# Patient Record
Sex: FEMALE | Race: WHITE | NOT HISPANIC OR LATINO | Employment: OTHER | URBAN - METROPOLITAN AREA
[De-identification: names, ages, dates, MRNs, and addresses within clinical notes are randomized per-mention and may not be internally consistent; named-entity substitution may affect disease eponyms.]

---

## 2017-02-27 ENCOUNTER — OFFICE VISIT (OUTPATIENT)
Dept: UROLOGY | Facility: CLINIC | Age: 72
End: 2017-02-27
Payer: MEDICARE

## 2017-02-27 VITALS
WEIGHT: 224.88 LBS | SYSTOLIC BLOOD PRESSURE: 112 MMHG | DIASTOLIC BLOOD PRESSURE: 62 MMHG | RESPIRATION RATE: 16 BRPM | BODY MASS INDEX: 39.84 KG/M2 | HEART RATE: 60 BPM | HEIGHT: 63 IN

## 2017-02-27 DIAGNOSIS — N39.46 MIXED INCONTINENCE URGE AND STRESS: Primary | ICD-10-CM

## 2017-02-27 PROCEDURE — 99214 OFFICE O/P EST MOD 30 MIN: CPT | Mod: S$GLB,,, | Performed by: UROLOGY

## 2017-02-27 PROCEDURE — 1159F MED LIST DOCD IN RCRD: CPT | Mod: S$GLB,,, | Performed by: UROLOGY

## 2017-02-27 PROCEDURE — 3078F DIAST BP <80 MM HG: CPT | Mod: S$GLB,,, | Performed by: UROLOGY

## 2017-02-27 PROCEDURE — 99999 PR PBB SHADOW E&M-EST. PATIENT-LVL IV: CPT | Mod: PBBFAC,,, | Performed by: UROLOGY

## 2017-02-27 PROCEDURE — 1126F AMNT PAIN NOTED NONE PRSNT: CPT | Mod: S$GLB,,, | Performed by: UROLOGY

## 2017-02-27 PROCEDURE — 3074F SYST BP LT 130 MM HG: CPT | Mod: S$GLB,,, | Performed by: UROLOGY

## 2017-02-27 PROCEDURE — 1157F ADVNC CARE PLAN IN RCRD: CPT | Mod: S$GLB,,, | Performed by: UROLOGY

## 2017-02-27 PROCEDURE — 1160F RVW MEDS BY RX/DR IN RCRD: CPT | Mod: S$GLB,,, | Performed by: UROLOGY

## 2017-02-27 RX ORDER — ISOSORBIDE MONONITRATE 60 MG/1
60 TABLET, EXTENDED RELEASE ORAL EVERY MORNING
COMMUNITY
Start: 2017-02-15 | End: 2017-11-14 | Stop reason: CLARIF

## 2017-02-27 RX ORDER — CIPROFLOXACIN 250 MG/1
500 TABLET, FILM COATED ORAL
Status: CANCELLED | OUTPATIENT
Start: 2017-02-27

## 2017-02-27 NOTE — PROGRESS NOTES
"  Subjective:       Dilcia Orozco is a 72 y.o. female who is an established patient who was referred by Dr Kohler for evaluation of incontinence.      She reports issues with UI for about 4-6 months. She reports she "leaks all the time." She is unable to express what makes her leak - likely combination of VIRGEN and UUI. 3ppd. Nocturia x 2. Denies UTI. Constipation occasionally. She has Myrbetriq listed in MAR but is unsure if she is taking this.     A1c (6/12) - 12.6. She has known DM2 with neuropathy.  She again had issues with hypoglycemia today in clinic (she takes DM meds but does not eat breakfast).     PVR (bladder scan) last visit - 0cc, 15cc    She returns today reporting that symptoms are about the same. There is again some confusion if she is taking Ditropan as well as Myrbetriq. She seemed then to be only be taking Ditropan and not Myrbetriq (last visit was the opposite). Nocturia x 2. Continues to report UUI though difficult to explain UI episodes. She is taking both medications now without improvement.     I see her  as a patient as well (PCa treated by [facility]).       The following portions of the patient's history were reviewed and updated as appropriate: allergies, current medications, past family history, past medical history, past social history, past surgical history and problem list.    Review of Systems  Constitutional: no fever or chills  ENT: no nasal congestion or sore throat  Respiratory: no cough or shortness of breath  Cardiovascular: no chest pain or palpitations  Gastrointestinal: no nausea or vomiting, tolerating diet  Genitourinary: as per HPI  Hematologic/Lymphatic: no easy bruising or lymphadenopathy  Musculoskeletal: no arthralgias or myalgias  Skin: no rashes or lesions  Neurological: no seizures or tremors  Behavioral/Psych: no auditory or visual hallucinations       Objective:    Vitals:   /62  Pulse 60  Resp 16  Ht 5' 3" (1.6 m)  Wt 102 kg (224 lb " 13.9 oz)  BMI 39.83 kg/m2    Physical Exam   General: well developed, well nourished in no acute distress  Head: normocephalic, atraumatic  Neck: supple, trachea midline, no obvious enlargement of thyroid  HEENT: EOMI, mucus membranes moist, sclera anicteric, no hearing impairment  Lungs: symmetric expansion, non-labored breathing  Cardiovascular: regular rate and rhythm, normal pulses  Abdomen: soft, non tender, non distended, no palpable masses, no hepatosplenomegaly, no hernias, no CVA tenderness  Musculoskeletal: no peripheral edema, normal ROM in bilateral upper and lower extremities  Lymphatics: no cervical or inguinal lymphadenopathy  Skin: no rashes or lesions  Neuro: alert and oriented x 3, no gross deficits  Psych: normal judgment and insight, normal mood/affect and non-anxious  Genitourinary:   patient declined exam      Lab Review   Urine analysis today in clinic shows positive for small ketones    Lab Results   Component Value Date    WBC 8.57 06/01/2012    HGB 11.4 (L) 06/01/2012    HCT 36.9 (L) 06/01/2012    MCV 81.1 (L) 06/01/2012     06/01/2012     Lab Results   Component Value Date    CREATININE 0.9 06/01/2012    BUN 19 06/01/2012       Imaging  NA       Assessment/Plan:      1. Mixed incontinence urge and stress    - Discussed difference of UUI and VIRGEN components. Reviewed etiology and workup of each.   - VIRGEN: Kegels, PFPT, bulking agent, MUS.   - UUI: Behavioral changes, PFPT, anticholinergics. Botox/InterStim for refractory UUI.   - Discussed importance of glucose control. Polyuria will make UI worse.   - Weight loss can also improve situation.   - Confusion as to what medication she is actually taking - seems to be taking both now (brought in all meds today)    - On Ditropan and Myrbetriq - still with sig UI   - Remains with DM issues   - Significant glucosuria that is likely contributing. No recent A1c in chart. Clear today.   - Plan for cysto/UDS 3/21/17 - ?Botox          Again  discussed importance of eating if taking DM medications. Hypoglycemia treated in clinic again today with PO glucose tabs.       Follow up in 2 weeks post-op

## 2017-02-27 NOTE — MR AVS SNAPSHOT
"    [facility]  [address]  [address]  [place] [address]  Phone: [phone]                  Dilcia Orozco   2017 11:00 AM   Office Visit    Description:  Female : 1945   Provider:  Jud Silverman MD   Department:  [facility]           Reason for Visit     Follow-up           Diagnoses this Visit        Comments    Mixed incontinence urge and stress    -  Primary            To Do List           Goals (5 Years of Data)              Today    16    Blood Pressure < 140/90   112/62  112/62  112/62    HEMOGLOBIN A1C < 7.0           LDL CHOLESTEROL < 100             Follow-Up and Disposition     Return in about 6 weeks (around 4/10/2017) for Post-op.      [facility]     [facility] Care [facility] -  Assistance  Registered nurses in the [facility] provide clinical advisement, health education, appointment booking, and other advisory services.  Call for this free service at [phone].             Medications           Message regarding Medications     Verify the changes and/or additions to your medication regime listed below are the same as discussed with your clinician today.  If any of these changes or additions are incorrect, please notify your healthcare provider.             Verify that the below list of medications is an accurate representation of the medications you are currently taking.  If none reported, the list may be blank. If incorrect, please contact your healthcare provider. Carry this list with you in case of emergency.           Current Medications     aspirin 81 MG Chew Take 81 mg by mouth once daily.    insulin needles, disposable, (BD ULTRA-FINE ENEIDA PEN NEEDLES) 32 x 5/32 " Ndle Inject 1 Syringe into the skin once daily.    isosorbide mononitrate (IMDUR) 60 MG 24 hr tablet     lancets (MICROLET LANCET) Misc Use as directed for blood sugar management    LEVEMIR FLEXTOUCH 100 unit/mL (3 mL) " InPn pen INJECT 35 UNITS UNDER THE SKIN TWICE DAILY    lisinopril (PRINIVIL,ZESTRIL) 20 MG tablet TAKE 1 TABLET BY MOUTH TWICE DAILY    metformin (GLUCOPHAGE) 1000 MG tablet Take 1,000 mg by mouth 2 (two) times daily with meals.    metoprolol succinate (TOPROL-XL) 25 MG 24 hr tablet Take 25 mg by mouth once daily.    mirabegron 50 mg Tb24 Take 1 tablet (50 mg total) by mouth once daily.    MYRBETRIQ 50 mg Tb24 TK 1 T PO QD    nitroGLYCERIN (NITROSTAT) 0.4 MG SL tablet Place 0.4 mg under the tongue every 5 (five) minutes as needed for Chest pain.    NOVOLOG FLEXPEN 100 unit/mL InPn pen INJECT 40 UNITS UNDER SKIN TID AC    oxybutynin (DITROPAN-XL) 10 MG 24 hr tablet Take 1 tablet (10 mg total) by mouth once daily.    simvastatin (ZOCOR) 20 MG tablet TK 1 T PO ONCE A DAY    tramadol (ULTRAM) 50 mg tablet Take 50 mg by mouth every 6 (six) hours as needed for Pain.    glucagon (human recombinant) inj 1mg/mL kit Inject 1 mL (1 mg total) into the skin As instructed. Follow package directions for low blood sugar.           Clinical Reference Information           Your Vitals Were     BP                   112/62           Blood Pressure          Most Recent Value    BP  112/62      Allergies as of 2/27/2017     Sulfa (Sulfonamide Antibiotics)    Vicodin [Hydrocodone-acetaminophen]    Meclomen      Immunizations Administered on Date of Encounter - 2/27/2017     None      [facility] Sign-Up     Activating your [facility] account is as easy as 1-2-3!     1) Visit [email or web address], select Sign Up Now, enter this activation code and your date of birth, then select Next.  [email or web address]  Expires: 4/13/2017 11:46 AM      2) Create a username and password to use when you visit [facility] in the future and select a security question in case you lose your password and select Next.    3) Enter your e-mail address and click Sign Up!    Additional Information  If you have questions, please e-mail [email or web address] or call [phone]  to talk to our [facility] staff. Remember, [facility] is NOT to be used for urgent needs. For medical emergencies, dial 911.         Language Assistance Services     ATTENTION: Language assistance services are available, free of charge. Please call [phone].      ATENCIÓN: Si habla diaz, tiene a merchant disposición servicios gratuitos de asistencia lingüística. Llame al [phone].     CHÚ Ý: N?u b?n nói Ti?ng Vi?t, có các d?ch v? h? tr? ngôn ng? mi?n phí dành cho b?n. G?i s? [phone].         [facility] complies with applicable Federal civil rights laws and does not discriminate on the basis of race, color, national origin, age, disability, or sex.

## 2017-03-03 ENCOUNTER — TELEPHONE (OUTPATIENT)
Dept: UROLOGY | Facility: CLINIC | Age: 72
End: 2017-03-03

## 2017-03-03 NOTE — TELEPHONE ENCOUNTER
Patient instructed to call the number on her packet of pre-op/consent papers to schedule a pre-op apt. Patient verbalized understanding.

## 2017-03-03 NOTE — TELEPHONE ENCOUNTER
----- Message from Tracie Olmos sent at 3/3/2017 10:06 AM CST -----  Contact: self  Pt states that she received papers from provider today and was told to call office. She can be reached @ [phone] [employer]

## 2017-03-07 ENCOUNTER — TELEPHONE (OUTPATIENT)
Dept: UROLOGY | Facility: CLINIC | Age: 72
End: 2017-03-07

## 2017-03-07 NOTE — TELEPHONE ENCOUNTER
----- Message from Brittany Montelongo sent at 3/7/2017  2:22 PM CST -----  Contact: [phone]  Pt is wanting to know the scheduled procedure is for on 3/21 Please call pt at your earliest convenience.  Thanks !

## 2017-03-07 NOTE — TELEPHONE ENCOUNTER
Patient wishes to push back the surgery until mid-April. Can you give me a date around that time? Thank you

## 2017-03-21 ENCOUNTER — HOSPITAL ENCOUNTER (OUTPATIENT)
Dept: PREADMISSION TESTING | Facility: HOSPITAL | Age: 72
Discharge: HOME | End: 2017-03-21
Attending: UROLOGY | Admitting: UROLOGY
Payer: MEDICARE

## 2017-03-21 ENCOUNTER — HOSPITAL ENCOUNTER (OUTPATIENT)
Facility: HOSPITAL | Age: 72
Discharge: HOME | End: 2017-03-21
Attending: UROLOGY | Admitting: UROLOGY
Payer: MEDICARE

## 2017-03-21 ENCOUNTER — SURGERY (OUTPATIENT)
Age: 72
End: 2017-03-21

## 2017-03-21 VITALS
BODY MASS INDEX: 37.91 KG/M2 | TEMPERATURE: 97 F | RESPIRATION RATE: 20 BRPM | OXYGEN SATURATION: 98 % | HEIGHT: 62 IN | DIASTOLIC BLOOD PRESSURE: 82 MMHG | HEART RATE: 82 BPM | WEIGHT: 206 LBS | SYSTOLIC BLOOD PRESSURE: 163 MMHG

## 2017-03-21 VITALS
RESPIRATION RATE: 20 BRPM | OXYGEN SATURATION: 97 % | DIASTOLIC BLOOD PRESSURE: 79 MMHG | TEMPERATURE: 98 F | SYSTOLIC BLOOD PRESSURE: 143 MMHG | HEART RATE: 82 BPM

## 2017-03-21 DIAGNOSIS — N39.46 MIXED INCONTINENCE URGE AND STRESS: Primary | ICD-10-CM

## 2017-03-21 PROCEDURE — 71000015 HC POSTOP RECOV 1ST HR: Performed by: UROLOGY

## 2017-03-21 PROCEDURE — 51728 CYSTOMETROGRAM W/VP: CPT | Mod: 26,,, | Performed by: UROLOGY

## 2017-03-21 PROCEDURE — 76000 FLUOROSCOPY <1 HR PHYS/QHP: CPT | Mod: 26,59,, | Performed by: UROLOGY

## 2017-03-21 PROCEDURE — 52000 CYSTOURETHROSCOPY: CPT | Mod: 59,,, | Performed by: UROLOGY

## 2017-03-21 PROCEDURE — 36000707: Performed by: UROLOGY

## 2017-03-21 PROCEDURE — 25000003 PHARM REV CODE 250: Performed by: UROLOGY

## 2017-03-21 PROCEDURE — 51784 ANAL/URINARY MUSCLE STUDY: CPT | Mod: 26,51,, | Performed by: UROLOGY

## 2017-03-21 PROCEDURE — 51797 INTRAABDOMINAL PRESSURE TEST: CPT | Mod: 26,,, | Performed by: UROLOGY

## 2017-03-21 PROCEDURE — 25500020 PHARM REV CODE 255: Performed by: UROLOGY

## 2017-03-21 PROCEDURE — 36000706: Performed by: UROLOGY

## 2017-03-21 RX ORDER — ONDANSETRON 2 MG/ML
4 INJECTION INTRAMUSCULAR; INTRAVENOUS EVERY 12 HOURS PRN
Status: DISCONTINUED | OUTPATIENT
Start: 2017-03-21 | End: 2017-03-21 | Stop reason: HOSPADM

## 2017-03-21 RX ORDER — CIPROFLOXACIN 500 MG/1
500 TABLET ORAL
Status: COMPLETED | OUTPATIENT
Start: 2017-03-21 | End: 2017-03-21

## 2017-03-21 RX ORDER — ACETAMINOPHEN 325 MG/1
650 TABLET ORAL EVERY 4 HOURS PRN
Status: DISCONTINUED | OUTPATIENT
Start: 2017-03-21 | End: 2017-03-21 | Stop reason: HOSPADM

## 2017-03-21 RX ORDER — LIDOCAINE HYDROCHLORIDE 20 MG/ML
JELLY TOPICAL
Status: DISCONTINUED | OUTPATIENT
Start: 2017-03-21 | End: 2017-03-21 | Stop reason: HOSPADM

## 2017-03-21 RX ADMIN — LIDOCAINE HYDROCHLORIDE 10 ML: 20 JELLY TOPICAL at 02:03

## 2017-03-21 RX ADMIN — IOTHALAMATE MEGLUMINE 250 ML: 172 INJECTION URETERAL at 02:03

## 2017-03-21 RX ADMIN — CIPROFLOXACIN HYDROCHLORIDE 500 MG: 500 TABLET, FILM COATED ORAL at 01:03

## 2017-03-21 NOTE — OP NOTE
"[facility]  Surgery Department  Operative Note      Date of Procedure:  03/21/2017 2:23 PM     Procedure:   1. Complex urodynamics with fluoroscopy  2. Cystoscopy    Surgeon(s) and Role:     * Jud Silverman MD - Primary    Assisting Surgeon: None    Pre-Operative Diagnosis: Mixed incontinence urge and stress [788.33]    Post-Operative Diagnosis: Post-Op Diagnosis Codes:     * Mixed incontinence urge and stress [788.33]    Indication for procedure:  Dilcia Orozco is a 72 y.o. female who is an established patient who was referred by Dr Kohler for evaluation of incontinence.      She reports issues with UI for about 4-6 months. She reports she "leaks all the time." She is unable to express what makes her leak - likely combination of VIRGEN and UUI. 3ppd. Nocturia x 2. Denies UTI. Constipation occasionally. She has Myrbetriq listed in MAR but is unsure if she is taking this.      A1c (6/12) - 12.6. She has known DM2 with neuropathy. She again had issues with hypoglycemia today in clinic (she takes DM meds but does not eat breakfast).      PVR (bladder scan) last visit - 0cc, 15cc     She returns today reporting that symptoms are about the same. There is again some confusion if she is taking Ditropan as well as Myrbetriq. She seemed then to be only be taking Ditropan and not Myrbetriq (last visit was the opposite). Nocturia x 2. Continues to report UUI though difficult to explain UI episodes. She is taking both medications now without improvement.       Description of procedure:  The patient was brought to the urodynamics suite and transferred to the urodynamics chair. Full timeout procedures were performed identifying the correct patient and procedure. Appropriate antibiotics with PO ciprofloxacin were given prior to commencement of surgery A 16-Citizen of [place] red rubber catheter was then placed per urethra after genitals were prepped with Betadine solution to remove any residual urine in " bladder.  P1 and P2 catheters were placed in the urethra into the bladder and rectally respectively. EMG senses were placed in the appropriate location on perineum and left leg. The bladder was filled at an appropriately slow rate.    PVR pre-procedure: 20mL    Filling phase:  Rate of fillin mL/min  First sensation: NA  First desire: 117mL  Strong desire: NA  Capacity: 149mL  Permission to void given at NA    Stress maneuvers (Valsalva and cough) at 150mL: full bladder volume leak  VLLP: unable to calculate (but very low)  Compliance: normal  ALLP: NA  Involuntary bladder contraction: frequent, low volume with UUI    Voiding phase:  Bladder contraction: present with IBC  Coordination: normal  Flow rate (max): 29.7mL/s  Flow rate (avg): 6.4mL/s  Pdet (max flow): 5.2cm H2O  Max Pdet: 32.7cm H2O  Voided volume: 165mL  PVR: 0mL    Fluoroscopy:  Bladder wall: smooth  Voiding: open bladder neck  Vesicoureteral reflux: none  Radiographic PVR: empty      Cystoscopy:  At the conclusion of the urodynamics procedure, P1 and P2 catheters as well as EMG sensors were removed. The patient then was placed in the dorsal lithotomy position and prepped and draped in the usual sterile fashion. Uro-Jet lidocaine was placed in the urethra for alfredo-operative analgesia. A 16-Maldivian flexible cystoscope was passed per urethra into the bladder. Full cystoscopy was performed systematically to inspect all aspects of the bladder wall. Retroflexion of the cystoscope was done to inspect the bladder neck. Bilateral UOs were visualized. Urethra was carefully inspected upon removal of cystoscope. The procedure was terminated. The patient tolerated the procedure well.    Urethra: normal, retracted  Bladder mucosa: smooth  Trabeculation: minimal  UOs: normal      Anesthesia: Local    Findings of the Procedure: Urgency with UUI at low volumes - ++DO/IBC. Difficult to fill bladder so low capacity on UDS. VIRGEN test with large volume leak and suspected  "VIRGEN-induced DO causing full emptying of bladder.     Complications: none    Estimated Blood Loss (EBL): 0cc          Drains: none    Specimens: none     Attestation: I was present the entirety of the procedure.           Disposition:  Patient is stable and deemed appropriate for discharge home. The patient will follow up in 2-3 weeks.    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 Recommend Botox initially for UUI. Sling would be technically challenging. Consider bulking agent though will like still have VIRGEN. Pessary unlikely to work due to severe vaginal sensitivity.        Discharge Note    SUMMARY     Admit Date:  03/21/2017 3:48 PM    Discharge Date and Time:  03/21/2017 3:48 PM    Hospital Course (synopsis of major diagnoses, care, treatment, and services provided during the course of the hospital stay): Uncomplicated cysto/UDS.     Final Diagnosis: Post-Op Diagnosis Codes:     * Mixed incontinence urge and stress [788.33]    Disposition: Home or Self Care    Follow Up/Patient Instructions:   Expect blood and burning with urination. Drink plenty of fluids.    Medications:  Reconciled Home Medications:   Current Discharge Medication List      CONTINUE these medications which have NOT CHANGED    Details   aspirin 81 MG Chew Take 81 mg by mouth once daily.      lisinopril (PRINIVIL,ZESTRIL) 20 MG tablet TAKE 1 TABLET BY MOUTH TWICE DAILY  Qty: 90 tablet, Refills: 0      metformin (GLUCOPHAGE) 1000 MG tablet Take 1,000 mg by mouth 2 (two) times daily with meals.      metoprolol succinate (TOPROL-XL) 25 MG 24 hr tablet Take 25 mg by mouth once daily.      insulin needles, disposable, (BD ULTRA-FINE ENEIDA PEN NEEDLES) 32 x 5/32 " Ndle Inject 1 Syringe into the skin once daily.  Qty: 100 each, Refills: 12    Comments: 90 day supply  Associated Diagnoses: Type II or unspecified type diabetes mellitus without mention of complication, not stated as uncontrolled    "   isosorbide mononitrate (IMDUR) 60 MG 24 hr tablet       lancets (MICROLET LANCET) Misc Use as directed for blood sugar management  Qty: 300 each, Refills: 3    Comments: **Patient requests 90 day supply**  Associated Diagnoses: Type II or unspecified type diabetes mellitus without mention of complication, not stated as uncontrolled      LEVEMIR FLEXTOUCH 100 unit/mL (3 mL) InPn pen INJECT 35 UNITS UNDER THE SKIN TWICE DAILY  Qty: 60 mL, Refills: 4    Comments: **Patient requests 90 days supply**  Associated Diagnoses: DM type 2, uncontrolled, with neuropathy      !! mirabegron 50 mg Tb24 Take 1 tablet (50 mg total) by mouth once daily.  Qty: 30 tablet, Refills: 11      !! MYRBETRIQ 50 mg Tb24 TK 1 T PO QD  Refills: 5      nitroGLYCERIN (NITROSTAT) 0.4 MG SL tablet Place 0.4 mg under the tongue every 5 (five) minutes as needed for Chest pain.      NOVOLOG FLEXPEN 100 unit/mL InPn pen INJECT 40 UNITS UNDER SKIN TID AC  Refills: 11      oxybutynin (DITROPAN-XL) 10 MG 24 hr tablet Take 1 tablet (10 mg total) by mouth once daily.  Qty: 90 tablet, Refills: 3    Comments: **Patient requests 90 days supply**      simvastatin (ZOCOR) 20 MG tablet TK 1 T PO ONCE A DAY  Refills: 3      tramadol (ULTRAM) 50 mg tablet Take 50 mg by mouth every 6 (six) hours as needed for Pain.       !! - Potential duplicate medications found. Please discuss with provider.          Discharge Procedure Orders  Diet general     Activity as tolerated     Call MD for:  temperature >100.4     Call MD for:  persistent nausea and vomiting     Call MD for:  severe uncontrolled pain     Call MD for:  difficulty breathing, headache or visual disturbances     No dressing needed     Follow-up Information     Follow up with Jud Silverman MD In 2 weeks.    Specialty:  Urology    Why:  For post-op follow up    Contact information:    [address] [place] [address] [id number] [phone]

## 2017-03-21 NOTE — IP AVS SNAPSHOT
[place] [address] [place] [address]  Phone: [phone]           Patient Discharge Instructions     Our goal is to set you up for success. This packet includes information on your condition, medications, and your home care. It will help you to care for yourself so you don't get sicker and need to go back to the hospital.     Please ask your nurse if you have any questions.        There are many details to remember when preparing to leave the hospital. Here is what you will need to do:    1. Take your medicine. If you are prescribed medications, review your Medication List in the following pages. You may have new medications to  at the pharmacy and others that you'll need to stop taking. Review the instructions for how and when to take your medications. Talk with your doctor or nurses if you are unsure of what to do.     2. Go to your follow-up appointments. Specific follow-up information is listed in the following pages. Your may be contacted by a transition nurse or clinical provider about future appointments. Be sure we have all of the phone numbers to reach you, if needed. Please contact your provider's office if you are unable to make an appointment.     3. Watch for warning signs. Your doctor or nurse will give you detailed warning signs to watch for and when to call for assistance. These instructions may also include educational information about your condition. If you experience any of warning signs to your health, call your doctor.               [facility]  Unless otherwise directed by your provider, please contact [facility], our nurse care line that is available for 24/7 assistance.     [phone] (toll-free)    Registered nurses in the [facility] provide clinical advisement, health education, appointment booking, and other advisory services.                    ** Verify the list of medication(s) below is accurate and up to date.  Carry this with you in case of emergency. If your medications have changed, please notify your healthcare provider.             Medication List      CONTINUE taking these medications        Additional Info                      aspirin 81 MG Chew   Refills:  0   Dose:  81 mg    Instructions:  Take 81 mg by mouth once daily.     Begin Date    AM    Noon    PM    Bedtime       isosorbide mononitrate 60 MG 24 hr tablet   Commonly known as:  IMDUR   Refills:  0      Begin Date    AM    Noon    PM    Bedtime       lancets Misc   Commonly known as:  MICROLET LANCET   Quantity:  300 each   Refills:  3   Comments:  **Patient requests 90 day supply**    Instructions:  Use as directed for blood sugar management     Begin Date    AM    Noon    PM    Bedtime       LEVEMIR FLEXTOUCH 100 unit/mL (3 mL) Inpn pen   Quantity:  60 mL   Refills:  4   Comments:  **Patient requests 90 days supply**   Generic drug:  insulin detemir    Instructions:  INJECT 35 UNITS UNDER THE SKIN TWICE DAILY     Begin Date    AM    Noon    PM    Bedtime       lisinopril 20 MG tablet   Commonly known as:  PRINIVIL,ZESTRIL   Quantity:  90 tablet   Refills:  0    Instructions:  TAKE 1 TABLET BY MOUTH TWICE DAILY     Begin Date    AM    Noon    PM    Bedtime       metformin 1000 MG tablet   Commonly known as:  GLUCOPHAGE   Refills:  0   Dose:  1000 mg    Instructions:  Take 1,000 mg by mouth 2 (two) times daily with meals.     Begin Date    AM    Noon    PM    Bedtime       metoprolol succinate 25 MG 24 hr tablet   Commonly known as:  TOPROL-XL   Refills:  0   Dose:  25 mg    Instructions:  Take 25 mg by mouth once daily.     Begin Date    AM    Noon    PM    Bedtime       * MYRBETRIQ 50 mg Tb24   Refills:  5   Generic drug:  mirabegron    Instructions:  TK 1 T PO QD     Begin Date    AM    Noon    PM    Bedtime       * mirabegron 50 mg Tb24   Quantity:  30 tablet   Refills:  11   Dose:  50 mg    Instructions:  Take 1 tablet (50 mg total) by mouth once daily.  "    Begin Date    AM    Noon    PM    Bedtime       nitroGLYCERIN 0.4 MG SL tablet   Commonly known as:  NITROSTAT   Refills:  0   Dose:  0.4 mg    Instructions:  Place 0.4 mg under the tongue every 5 (five) minutes as needed for Chest pain.     Begin Date    AM    Noon    PM    Bedtime       NOVOLOG FLEXPEN 100 unit/mL Inpn pen   Refills:  11   Generic drug:  insulin aspart    Instructions:  INJECT 40 UNITS UNDER SKIN TID AC     Begin Date    AM    Noon    PM    Bedtime       oxybutynin 10 MG 24 hr tablet   Commonly known as:  DITROPAN-XL   Quantity:  90 tablet   Refills:  3   Dose:  10 mg   Comments:  **Patient requests 90 days supply**    Instructions:  Take 1 tablet (10 mg total) by mouth once daily.     Begin Date    AM    Noon    PM    Bedtime       pen needle, diabetic 32 gauge x 5/32" Ndle   Commonly known as:  BD ULTRA-FINE ENEIDA PEN NEEDLES   Quantity:  100 each   Refills:  12   Dose:  1 Syringe   Comments:  90 day supply    Instructions:  Inject 1 Syringe into the skin once daily.     Begin Date    AM    Noon    PM    Bedtime       simvastatin 20 MG tablet   Commonly known as:  ZOCOR   Refills:  3    Instructions:  TK 1 T PO ONCE A DAY     Begin Date    AM    Noon    PM    Bedtime       tramadol 50 mg tablet   Commonly known as:  ULTRAM   Refills:  0   Dose:  50 mg    Instructions:  Take 50 mg by mouth every 6 (six) hours as needed for Pain.     Begin Date    AM    Noon    PM    Bedtime       * Notice:  This list has 2 medication(s) that are the same as other medications prescribed for you. Read the directions carefully, and ask your doctor or other care provider to review them with you.               Please bring to all follow up appointments:    1. A copy of your discharge instructions.  2. All medicines you are currently taking in their original bottles.  3. Identification and insurance card.    Please arrive 15 minutes ahead of scheduled appointment time.    Please call 24 hours in advance if you must " reschedule your appointment and/or time.        Your Scheduled Appointments     Apr 10, 2017 11:20 AM CDT   Established Patient Visit with Jud Silverman MD   [facility] ([facility])    [address] [facility] [address]  [address]  [facility] [address] [phone]              Follow-up Information     Follow up with Jud Silverman MD In 2 weeks.    Specialty:  Urology    Contact information:    [address]  [address]  [facility] [address]  [phone]          Discharge Instructions     Future Orders    Activity as tolerated     Call MD for:  difficulty breathing, headache or visual disturbances     Call MD for:  persistent nausea and vomiting     Call MD for:  severe uncontrolled pain     Call MD for:  temperature >100.4     Diet general     Questions:    Total calories:      Fat restriction, if any:      Protein restriction, if any:      Na restriction, if any:      Fluid restriction:      Additional restrictions:      No dressing needed         Discharge Instructions       Expect blood and/or burning with urination. Drink plenty of fluids.    ACTIVITY LEVEL: If you have received sedation or an anesthetic, you may feel sleepy for several hours. Rest until you are more awake. Gradually resume your normal activities.       DIET: You may resume your home diet. If nausea is present, increase your diet gradually with fluids and bland foods.      Medications: Pain medication should be taken only if needed and as directed. If antibiotics are prescribed, the medication should be taken until completed. You will be given an updated list of you medications.  ? No driving, alcoholic beverages or signing legal documents for next 24 hours or while taking pain medication        CALL THE DOCTOR:       · Fever over 101°F  · Severe pain that doesnt go away with medication.  · Upset stomach and vomiting that is persistent.  · Problems urinating-unable to urinate or heavy bleeding (with or without  clots)       Fall Prevention  Millions of people fall every year and injure themselves. You may have had anesthesia or sedation which may increase your risk of falling. You may have health issues that put you at an increased risk of falling.     Here are ways to reduce your risk of falling.  ·   · Make your home safe by keeping walkways clear of objects you may trip over.  · Use non-slip pads under rugs. Do not use area rugs or small throw rugs.  · Use non-slip mats in bathtubs and showers.  · Install handrails and lights on staircases.  · Do not walk in poorly lit areas.  · Do not stand on chairs or wobbly ladders.  · Use caution when reaching overhead or looking upward. This position can cause a loss of balance.  · Be sure your shoes fit properly, have non-slip bottoms and are in good condition.   · Wear shoes both inside and out. Avoid going barefoot or wearing slippers.  · Be cautious when going up and down stairs, curbs, and when walking on uneven sidewalks.  · If your balance is poor, consider using a cane or walker.  · If your fall was related to alcohol use, stop or limit alcohol intake.   · If your fall was related to use of sleeping medicines, talk to your doctor about this. You may need to reduce your dosage at bedtime if you awaken during the night to go to the bathroom.    · To reduce the need for nighttime bathroom trips:  ¨ Avoid drinking fluids for several hours before going to bed  ¨ Empty your bladder before going to bed  ¨ Men can keep a urinal at the bedside  · Stay as active as you can. Balance, flexibility, strength, and endurance all come from exercise. They all play a role in preventing falls. Ask your healthcare provider which types of activity are right for you.  · Get your vision checked on a regular basis.  · If you have pets, know where they are before you stand up or walk so you don't trip over them.  · Use night lights.      Primary Diagnosis     Your primary diagnosis was:  Loss Of  Bladder Control      Admission Information     Date & Time Provider Department CSN    3/21/2017 12:55 PM Jud Silverman MD [facility] [id number]      Care Providers     Provider Role Specialty Primary office phone    Jud Silverman MD Attending Provider Urology [phone]    Jud Silverman MD Surgeon  Urology [phone]      Your Vitals Were     BP Pulse Temp Resp SpO2       142/88 86 98.1 °F (36.7 °C) (Oral) 18 97%       Recent Lab Values        7/8/2011 6/1/2012                       12:40 PM 11:52 AM          A1C 9.7 (H) 12.6 (H)                     Allergies as of 3/21/2017        Reactions    Sulfa (Sulfonamide Antibiotics)     Vicodin [Hydrocodone-acetaminophen]     Meclomen Rash      Advance Directives     An advance directive is a document which, in the event you are no longer able to make decisions for yourself, tells your healthcare team what kind of treatment you do or do not want to receive, or who you would like to make those decisions for you.  If you do not currently have an advance directive, [facility] encourages you to create one.  For more information call:  [phone]), [phone] ([phone]),  or log on to [email or web address].        Language Assistance Services     ATTENTION: Language assistance services are available, free of charge. Please call [phone].      ATENCIÓN: Si habla español, tiene a merchant disposición servicios gratuitos de asistencia lingüística. Llame al [phone].     CHÚ Ý: N?u b?n nói Ti?ng Vi?t, có các d?ch v? h? tr? ngôn ng? mi?n phí dành cho b?n. G?i s? [phone].        Diabetes Discharge Instructions                                   [facility] Sign[facility]Up     Activating your [facility] account is as easy as 1-2-3!     1) Visit [email or web address], select Sign Up Now, enter this activation code and your date of birth, then select Next.  [email or web address]  Expires: 4/13/2017 12:46 PM      2) Create a  username and password to use when you visit [facility] in the future and select a security question in case you lose your password and select Next.    3) Enter your e-mail address and click Sign Up!    Additional Information  If you have questions, please e-mail [email or web address] or call [phone] to talk to our [facility] staff. Remember, [facility] is NOT to be used for urgent needs. For medical emergencies, dial 911.          [facility] complies with applicable Federal civil rights laws and does not discriminate on the basis of race, color, national origin, age, disability, or sex.

## 2017-03-21 NOTE — DISCHARGE INSTRUCTIONS
Expect blood and/or burning with urination. Drink plenty of fluids.    ACTIVITY LEVEL: If you have received sedation or an anesthetic, you may feel sleepy for several hours. Rest until you are more awake. Gradually resume your normal activities.       DIET: You may resume your home diet. If nausea is present, increase your diet gradually with fluids and bland foods.      Medications: Pain medication should be taken only if needed and as directed. If antibiotics are prescribed, the medication should be taken until completed. You will be given an updated list of you medications.  ? No driving, alcoholic beverages or signing legal documents for next 24 hours or while taking pain medication        CALL THE DOCTOR:       · Fever over 101°F  · Severe pain that doesnt go away with medication.  · Upset stomach and vomiting that is persistent.  · Problems urinating-unable to urinate or heavy bleeding (with or without clots)       Fall Prevention  Millions of people fall every year and injure themselves. You may have had anesthesia or sedation which may increase your risk of falling. You may have health issues that put you at an increased risk of falling.     Here are ways to reduce your risk of falling.  ·   · Make your home safe by keeping walkways clear of objects you may trip over.  · Use non-slip pads under rugs. Do not use area rugs or small throw rugs.  · Use non-slip mats in bathtubs and showers.  · Install handrails and lights on staircases.  · Do not walk in poorly lit areas.  · Do not stand on chairs or wobbly ladders.  · Use caution when reaching overhead or looking upward. This position can cause a loss of balance.  · Be sure your shoes fit properly, have non-slip bottoms and are in good condition.   · Wear shoes both inside and out. Avoid going barefoot or wearing slippers.  · Be cautious when going up and down stairs, curbs, and when walking on uneven sidewalks.  · If your balance is poor, consider using a  cane or walker.  · If your fall was related to alcohol use, stop or limit alcohol intake.   · If your fall was related to use of sleeping medicines, talk to your doctor about this. You may need to reduce your dosage at bedtime if you awaken during the night to go to the bathroom.    · To reduce the need for nighttime bathroom trips:  ¨ Avoid drinking fluids for several hours before going to bed  ¨ Empty your bladder before going to bed  ¨ Men can keep a urinal at the bedside  · Stay as active as you can. Balance, flexibility, strength, and endurance all come from exercise. They all play a role in preventing falls. Ask your healthcare provider which types of activity are right for you.  · Get your vision checked on a regular basis.  · If you have pets, know where they are before you stand up or walk so you don't trip over them.  · Use night lights.

## 2017-04-10 ENCOUNTER — OFFICE VISIT (OUTPATIENT)
Dept: UROLOGY | Facility: CLINIC | Age: 72
End: 2017-04-10
Payer: MEDICARE

## 2017-04-10 VITALS
BODY MASS INDEX: 37.91 KG/M2 | HEIGHT: 62 IN | RESPIRATION RATE: 16 BRPM | DIASTOLIC BLOOD PRESSURE: 74 MMHG | SYSTOLIC BLOOD PRESSURE: 142 MMHG | WEIGHT: 206 LBS | HEART RATE: 68 BPM

## 2017-04-10 DIAGNOSIS — N39.46 MIXED INCONTINENCE URGE AND STRESS: Primary | ICD-10-CM

## 2017-04-10 PROCEDURE — 1159F MED LIST DOCD IN RCRD: CPT | Mod: S$GLB,,, | Performed by: UROLOGY

## 2017-04-10 PROCEDURE — 99214 OFFICE O/P EST MOD 30 MIN: CPT | Mod: S$GLB,,, | Performed by: UROLOGY

## 2017-04-10 PROCEDURE — 3077F SYST BP >= 140 MM HG: CPT | Mod: S$GLB,,, | Performed by: UROLOGY

## 2017-04-10 PROCEDURE — 1160F RVW MEDS BY RX/DR IN RCRD: CPT | Mod: S$GLB,,, | Performed by: UROLOGY

## 2017-04-10 PROCEDURE — 3078F DIAST BP <80 MM HG: CPT | Mod: S$GLB,,, | Performed by: UROLOGY

## 2017-04-10 PROCEDURE — 99999 PR PBB SHADOW E&M-EST. PATIENT-LVL IV: CPT | Mod: PBBFAC,,, | Performed by: UROLOGY

## 2017-04-10 PROCEDURE — 1125F AMNT PAIN NOTED PAIN PRSNT: CPT | Mod: S$GLB,,, | Performed by: UROLOGY

## 2017-04-10 PROCEDURE — 1157F ADVNC CARE PLAN IN RCRD: CPT | Mod: S$GLB,,, | Performed by: UROLOGY

## 2017-04-10 RX ORDER — FLUCONAZOLE 150 MG/1
150 TABLET ORAL DAILY
Qty: 3 TABLET | Refills: 0 | Status: SHIPPED | OUTPATIENT
Start: 2017-04-10 | End: 2017-10-13 | Stop reason: ALTCHOICE

## 2017-04-10 RX ORDER — MUPIROCIN 20 MG/G
1 OINTMENT TOPICAL 3 TIMES DAILY
COMMUNITY
Start: 2017-03-06

## 2017-04-10 RX ORDER — CIPROFLOXACIN 2 MG/ML
400 INJECTION, SOLUTION INTRAVENOUS
Status: CANCELLED | OUTPATIENT
Start: 2017-04-10

## 2017-04-10 NOTE — MR AVS SNAPSHOT
[facility]  [address] [facility] [address]  [facility] [address]  Phone: [phone]                  Dilcia Orozco   4/10/2017 11:20 AM   Office Visit    Description:  Female : 1945   Provider:  Jud Silverman MD   Department:  [facility]           Reason for Visit     Follow-up           Diagnoses this Visit        Comments    Mixed incontinence urge and stress    -  Primary            To Do List           Goals (5 Years of Data)              Today    3/21/17    3/21/17    Blood Pressure < 140/90   142/74  142/74  142/74    HEMOGLOBIN A1C < 7.0           LDL CHOLESTEROL < 100             Follow-Up and Disposition     Return in about 4 weeks (around 2017) for Post-op, PVR check.       These Medications        Disp Refills Start End    fluconazole (DIFLUCAN) 150 MG Tab 3 tablet 0 4/10/2017     Take 1 tablet (150 mg total) by mouth once daily. - Oral    Pharmacy: [address] Ph #: [phone]         [facility]     [facility] Nurse Care Line -  Assistance  Unless otherwise directed by your provider, please contact [facility], our nurse care line that is available for  assistance.     Registered nurses in the [facility] provide: appointment scheduling, clinical advisement, health education, and other advisory services.  Call: [phone] (toll free)               Medications           Message regarding Medications     Verify the changes and/or additions to your medication regime listed below are the same as discussed with your clinician today.  If any of these changes or additions are incorrect, please notify your healthcare provider.        START taking these NEW medications        Refills    fluconazole (DIFLUCAN) 150 MG Tab 0    Sig: Take 1 tablet (150 mg total) by mouth once daily.    Class: Normal    Route: Oral           Verify that the below list of  "medications is an accurate representation of the medications you are currently taking.  If none reported, the list may be blank. If incorrect, please contact your healthcare provider. Carry this list with you in case of emergency.           Current Medications     aspirin 81 MG Chew Take 81 mg by mouth once daily.    fluconazole (DIFLUCAN) 150 MG Tab Take 1 tablet (150 mg total) by mouth once daily.    insulin needles, disposable, (BD ULTRA-FINE ENEIDA PEN NEEDLES) 32 x 5/32 " Ndle Inject 1 Syringe into the skin once daily.    isosorbide mononitrate (IMDUR) 60 MG 24 hr tablet     lancets (MICROLET LANCET) Misc Use as directed for blood sugar management    LEVEMIR FLEXTOUCH 100 unit/mL (3 mL) InPn pen INJECT 35 UNITS UNDER THE SKIN TWICE DAILY    lisinopril (PRINIVIL,ZESTRIL) 20 MG tablet TAKE 1 TABLET BY MOUTH TWICE DAILY    metformin (GLUCOPHAGE) 1000 MG tablet Take 1,000 mg by mouth 2 (two) times daily with meals.    metoprolol succinate (TOPROL-XL) 25 MG 24 hr tablet Take 25 mg by mouth once daily.    mirabegron 50 mg Tb24 Take 1 tablet (50 mg total) by mouth once daily.    mupirocin (BACTROBAN) 2 % ointment     MYRBETRIQ 50 mg Tb24 TK 1 T PO QD    nitroGLYCERIN (NITROSTAT) 0.4 MG SL tablet Place 0.4 mg under the tongue every 5 (five) minutes as needed for Chest pain.    NOVOLOG FLEXPEN 100 unit/mL InPn pen INJECT 40 UNITS UNDER SKIN TID AC    oxybutynin (DITROPAN-XL) 10 MG 24 hr tablet Take 1 tablet (10 mg total) by mouth once daily.    simvastatin (ZOCOR) 20 MG tablet TK 1 T PO ONCE A DAY    tramadol (ULTRAM) 50 mg tablet Take 50 mg by mouth every 6 (six) hours as needed for Pain.           Clinical Reference Information           Your Vitals Were     BP Pulse Resp Height Weight BMI    142/74 68 16 5' 2" (1.575 m) 93.4 kg (206 lb) 37.68 kg/m2      Blood Pressure          Most Recent Value    BP  (!)  142/74      Allergies as of 4/10/2017     Sulfa (Sulfonamide Antibiotics)    Vicodin [Hydrocodone-acetaminophen] "    Meclomen      Immunizations Administered on Date of Encounter - 4/10/2017     None      [facility] Sign-Up     Activating your [facility] account is as easy as 1-2-3!     1) Visit [email or web address], select Sign Up Now, enter this activation code and your date of birth, then select Next.  [email or web address]  Expires: 4/13/2017 12:46 PM      2) Create a username and password to use when you visit [facility] in the future and select a security question in case you lose your password and select Next.    3) Enter your e-mail address and click Sign Up!    Additional Information  If you have questions, please e-mail [email or web address] or call [phone] to talk to our [facility] staff. Remember, [facility] is NOT to be used for urgent needs. For medical emergencies, dial 911.         Language Assistance Services     ATTENTION: Language assistance services are available, free of charge. Please call [phone].      ATENCIÓN: Si habla español, tiene a merchant disposición servicios gratuitos de asistencia lingüística. Llame al [phone].     CHÚ Ý: N?u b?n nói Ti?ng Vi?t, có các d?ch v? h? tr? ngôn ng? mi?n phí dành cho b?n. G?i s? [phone].         [facility] complies with applicable Federal civil rights laws and does not discriminate on the basis of race, color, national origin, age, disability, or sex.

## 2017-04-10 NOTE — PROGRESS NOTES
"  Subjective:       Dilcia Orozco is a 72 y.o. female who is an established patient who was referred by Dr Kohler for evaluation of incontinence.      She reports issues with UI for about 4-6 months. She reports she "leaks all the time." She is unable to express what makes her leak - likely combination of VIRGEN and UUI. 3ppd. Nocturia x 2. Denies UTI. Constipation occasionally. She has Myrbetriq listed in MAR but is unsure if she is taking this.     A1c (6/12) - 12.6. She has known DM2 with neuropathy.  She again had issues with hypoglycemia today in clinic (she takes DM meds but does not eat breakfast).     PVR (bladder scan) last visit - 0cc, 15cc    She returns today reporting that symptoms are about the same. There is again some confusion if she is taking Ditropan as well as Myrbetriq. She seemed then to be only be taking Ditropan and not Myrbetriq (last visit was the opposite). Nocturia x 2. Continues to report UUI though difficult to explain UI episodes. She is taking both medications now without improvement.     I see her  as a patient as well (PCa treated by [facility]).     Cysto/UDS 3/21/17:  Findings of the Procedure: Urgency with UUI at low volumes - ++DO/IBC. Difficult to fill bladder so low capacity on UDS. 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 Recommend Botox initially for UUI. Sling would be technically challenging. Consider bulking agent though will like still have VIRGEN. Pessary unlikely to work due to severe vaginal sensitivity.      The following portions of the patient's history were reviewed and updated as appropriate: allergies, current medications, past family history, past medical history, past social history, past surgical history and problem list.    Review of Systems  Constitutional: no fever or chills  ENT: no " "nasal congestion or sore throat  Respiratory: no cough or shortness of breath  Cardiovascular: no chest pain or palpitations  Gastrointestinal: no nausea or vomiting, tolerating diet  Genitourinary: as per HPI  Hematologic/Lymphatic: no easy bruising or lymphadenopathy  Musculoskeletal: no arthralgias or myalgias  Skin: no rashes or lesions  Neurological: no seizures or tremors  Behavioral/Psych: no auditory or visual hallucinations       Objective:    Vitals:   BP (!) 142/74  Pulse 68  Resp 16  Ht 5' 2" (1.575 m)  Wt 93.4 kg (206 lb)  BMI 37.68 kg/m2    Physical Exam   General: well developed, well nourished in no acute distress  Head: normocephalic, atraumatic  Neck: supple, trachea midline, no obvious enlargement of thyroid  HEENT: EOMI, mucus membranes moist, sclera anicteric, no hearing impairment  Lungs: symmetric expansion, non-labored breathing  Cardiovascular: regular rate and rhythm, normal pulses  Abdomen: soft, non tender, non distended, no palpable masses, no hepatosplenomegaly, no hernias, no CVA tenderness  Musculoskeletal: no peripheral edema, normal ROM in bilateral upper and lower extremities  Lymphatics: no cervical or inguinal lymphadenopathy  Skin: no rashes or lesions  Neuro: alert and oriented x 3, no gross deficits  Psych: normal judgment and insight, normal mood/affect and non-anxious  Genitourinary:   patient declined exam      Lab Review   Urine analysis today in clinic shows positive for small ketones    Lab Results   Component Value Date    WBC 8.57 06/01/2012    HGB 11.4 (L) 06/01/2012    HCT 36.9 (L) 06/01/2012    MCV 81.1 (L) 06/01/2012     06/01/2012     Lab Results   Component Value Date    CREATININE 0.9 06/01/2012    BUN 19 06/01/2012       Imaging  NA       Assessment/Plan:      1. Mixed incontinence urge and stress    - Discussed difference of UUI and VIRGEN components. Reviewed etiology and workup of each.   - VIRGEN: Kegels, PFPT, bulking agent, MUS.   - UUI: Behavioral " changes, PFPT, anticholinergics. Botox/InterStim for refractory UUI.   - Discussed importance of glucose control. Polyuria will make UI worse.   - Weight loss can also improve situation.   - Confusion as to what medication she is actually taking - seems to be taking both now (brought in all meds today)    - On Ditropan and Myrbetriq - still with sig UI   - Remains with DM issues   - Significant glucosuria that is likely contributing. No recent A1c in chart. Clear today.   - s/p cysto/UDS 3/21/17   - Recommend cysto/Botox on 4/28/17. Discussed risks including bleeding, UR.   - Diflucan for suspected yeast infection              Follow up in 1 week post-op with PVR

## 2017-04-13 ENCOUNTER — TELEPHONE (OUTPATIENT)
Dept: UROLOGY | Facility: CLINIC | Age: 72
End: 2017-04-13

## 2017-04-13 NOTE — TELEPHONE ENCOUNTER
----- Message from Catrachita Perdomo sent at 4/13/2017 12:14 PM CDT -----  Contact: pt  _  1st Request  _  2nd Request  _  3rd Request        Who: pt     Why: pt needs to speak to the nurse regarding procedure on 4/28/17.    What Number to Call Back:[phone]    When to Expect a call back: (Before the end of the day)   -- if the call is after 12:00, the call back will be tomorrow.

## 2017-04-19 ENCOUNTER — TELEPHONE (OUTPATIENT)
Dept: UROLOGY | Facility: CLINIC | Age: 72
End: 2017-04-19

## 2017-04-19 NOTE — TELEPHONE ENCOUNTER
----- Message from Catrachita Perdomo sent at 4/19/2017  9:04 AM CDT -----  Contact: sagar with cardiology [phone]  _  1st Request  _  2nd Request  _  3rd Request        Who: sagar with cardiology [phone]    Why: need chart notes any testing. Also the request for clearance. Fax [phone]    What Number to Call Back:sagar with cardiology [phone]    When to Expect a call back: (Before the end of the day)   -- if the call is after 12:00, the call back will be tomorrow.

## 2017-04-24 ENCOUNTER — TELEPHONE (OUTPATIENT)
Dept: UROLOGY | Facility: CLINIC | Age: 72
End: 2017-04-24

## 2017-04-24 ENCOUNTER — HOSPITAL ENCOUNTER (OUTPATIENT)
Dept: PREADMISSION TESTING | Facility: HOSPITAL | Age: 72
Discharge: HOME | End: 2017-04-24
Attending: UROLOGY
Payer: MEDICARE

## 2017-04-24 VITALS
SYSTOLIC BLOOD PRESSURE: 157 MMHG | RESPIRATION RATE: 16 BRPM | WEIGHT: 206.56 LBS | TEMPERATURE: 98 F | BODY MASS INDEX: 38.01 KG/M2 | HEART RATE: 91 BPM | HEIGHT: 62 IN | OXYGEN SATURATION: 97 % | DIASTOLIC BLOOD PRESSURE: 80 MMHG

## 2017-04-24 DIAGNOSIS — Z01.818 PRE-OP TESTING: Primary | ICD-10-CM

## 2017-04-24 LAB
ANION GAP SERPL CALC-SCNC: 10 MMOL/L
BASOPHILS # BLD AUTO: 0.03 K/UL
BASOPHILS NFR BLD: 0.6 %
BUN SERPL-MCNC: 7 MG/DL
CALCIUM SERPL-MCNC: 8.8 MG/DL
CHLORIDE SERPL-SCNC: 101 MMOL/L
CO2 SERPL-SCNC: 25 MMOL/L
CREAT SERPL-MCNC: 1 MG/DL
DIFFERENTIAL METHOD: ABNORMAL
EOSINOPHIL # BLD AUTO: 0.2 K/UL
EOSINOPHIL NFR BLD: 3.3 %
ERYTHROCYTE [DISTWIDTH] IN BLOOD BY AUTOMATED COUNT: 15 %
EST. GFR  (AFRICAN AMERICAN): >60 ML/MIN/1.73 M^2
EST. GFR  (NON AFRICAN AMERICAN): 57 ML/MIN/1.73 M^2
GLUCOSE SERPL-MCNC: 510 MG/DL
HCT VFR BLD AUTO: 39.8 %
HGB BLD-MCNC: 13.7 G/DL
LYMPHOCYTES # BLD AUTO: 1.1 K/UL
LYMPHOCYTES NFR BLD: 22.2 %
MCH RBC QN AUTO: 31.6 PG
MCHC RBC AUTO-ENTMCNC: 34.4 %
MCV RBC AUTO: 92 FL
MONOCYTES # BLD AUTO: 0.3 K/UL
MONOCYTES NFR BLD: 6.7 %
NEUTROPHILS # BLD AUTO: 3.4 K/UL
NEUTROPHILS NFR BLD: 67 %
PLATELET # BLD AUTO: 89 K/UL
PMV BLD AUTO: 11.9 FL
POTASSIUM SERPL-SCNC: 4.2 MMOL/L
RBC # BLD AUTO: 4.33 M/UL
SODIUM SERPL-SCNC: 136 MMOL/L
WBC # BLD AUTO: 5.08 K/UL

## 2017-04-24 PROCEDURE — 80048 BASIC METABOLIC PNL TOTAL CA: CPT

## 2017-04-24 PROCEDURE — 93005 ELECTROCARDIOGRAM TRACING: CPT

## 2017-04-24 PROCEDURE — 85025 COMPLETE CBC W/AUTO DIFF WBC: CPT

## 2017-04-24 PROCEDURE — 36415 COLL VENOUS BLD VENIPUNCTURE: CPT

## 2017-04-24 NOTE — PRE ADMISSION SCREENING
Patient saw Cardiologist today, Dr. Reynolds.  States Cardiac Clearance sent to Dr. Silverman's office.

## 2017-04-24 NOTE — TELEPHONE ENCOUNTER
Patient had additional questions about elevated sugar levels- questions for patient answered- advise to follow up with [facility] to get a new meter as hers is broken

## 2017-04-24 NOTE — TELEPHONE ENCOUNTER
----- Message from Stephan Aguirre sent at 4/24/2017  4:51 PM CDT -----  Contact: pt  x_ 1st Request   _ 2nd Request   _ 3rd Request     Who: DARIN NUNEZ [[id number]]    Why: pt is requesting a call back from Melanie in reference to additional questions she has.    What Number to Call Back: [phone]    When to Expect a call back: (Before the end of the day)   -- if call after 3:00 call back will be tomorrow.

## 2017-04-24 NOTE — TELEPHONE ENCOUNTER
[facility] called to report that patient has a glucose level of 510- which was reported to provider. Provider recommended that patient continue with checking BS as directed and treat accordingly- patient notified of elevated glucose levels and was instructed to continue checking BS as directed by provider and if she felt any s/s of hypo/hyperglycemia call for help. Patient states that she has been checking and did treat BS accordingly and reported feeling well and no distress noted.

## 2017-04-24 NOTE — DISCHARGE INSTRUCTIONS
"  Your surgery is scheduled for Friday April 28, 2017__.    Call [phone] between 2 p.m. and 5 p.m. on   __Thursday___ to find out your arrival time for the day of your surgery.      Please report to SAME DAY SURGERY UNIT on the 2nd FLOOR at _______ a.m.  Use front door entrance. The doors open at 0530 am.      If you need WHEELCHAIR assistance please call  [phone] from your cell phone or "0"  from the  hospital courtesy phone located to the right after you enter the hospital lobby.      INSTRUCTIONS IMPORTANT!!!  ¨ Do not eat or drink after 12 midnight-including water. OK to brush teeth, no   gum, candy or mints!    ¨ Take only these medicines with a small swallow of water-morning of surgery.  Take Isosorbide and Metoprolol morning of surgery with swallow of water.          _x___  Prep instructions:    SHOWER    _x___  Do not wear makeup, including mascara. WEARING EYE MAKEUP MAY  LEAD TO SERIOUS EYE INJURY during surgery.  _x___  No powder, lotions or creams to your body.  __x__  You may wear only deodorant on the day of surgery.  __x__  Please remove all jewelry, including piercings and leave at home.  _x___  No money or valuables needed. Please leave at home.  You may bring your cell phone.  ____  Please bring any documents given by your doctor.  _x___  If going home the same day, arrange for a ride home. You will not be able to   drive if Anesthesia was used.  ____  Children under 18 years require a parent / guardian present the entire time   they are in surgery / recovery.  _x___  Wear loose fitting clothing. Allow for dressings, bandages.  _x___  Stop Aspirin, Ibuprofen, Motrin and Aleve at least 3-5 days before surgery, unless otherwise instructed by your doctor, or the nurse.              You MAY use Tylenol/acetaminophen until day of surgery.  ____  If you take diabetic medication, do not take am of surgery unless instructed by   Doctor.  __x__  Call MD for temperature above 101 degrees.        _x___ Stop " taking any Fish Oil supplement or any Vitamins that contain Vitamin  E at least 5 days prior to surgery.          I have read or had read and explained to me, and understand the above information.  Additional comments or instructions:Please call   [phone] if you have any questions regarding the instructions above.

## 2017-04-24 NOTE — IP AVS SNAPSHOT
[place] [address] [place] [address]  Phone: [phone]           Patient Discharge Instructions  Our goal is to set you up for success. This packet includes information on your condition, medications, and your home care. It will help you care for yourself to prevent having to return to the hospital.     Please ask your nurse if you have any questions.      There are many details to remember when preparing for your surgery. Here is what you will need to do, please ask your nurse if there are more specific instructions and if you have any questions:    1. Before procedure Do not smoke or drink alcoholic beverages 24 hours prior to your procedure. Do not eat or drink anything 8 hours before your procedure - this includes gum, mints, and candy.     2. Day of procedure Please remove all jewelry for the procedure. If you wear contact lenses, dentures, hearing aids or glasses, bring a container to put them in during your surgery and give to a family member.  If your doctor has scheduled you for an overnight stay, bring a small overnight bag with any personal items that you need.      3. After procedure  Make arrangements in advance for transportation home by a responsible adult. It is not safe to drive a vehicle during the 24 hours following surgery.     PLEASE NOTE: You may be contacted the day before your surgery to confirm your surgery date and arrival time. The Surgery schedule has many variables which may affect the time of your surgery case. Family members should be available if your surgery time changes.           [facility]  Unless otherwise directed by your provider, please   contact [facility], our nurse care line   that is available for 24/7 assistance.     [phone] (toll-free)     Registered nurses in the [facility]   provide: appointment scheduling, clinical advisement, health education, and other advisory services.                  ** Verify  the list of medication(s) below is accurate and up to date. Carry this with you in case of emergency. If your medications have changed, please notify your healthcare provider.             Medication List      TAKE these medications        Additional Info                      aspirin 81 MG Chew   Refills:  0   Dose:  81 mg    Instructions:  Take 81 mg by mouth once daily.     Begin Date    AM    Noon    PM    Bedtime       fluconazole 150 MG Tab   Commonly known as:  DIFLUCAN   Quantity:  3 tablet   Refills:  0   Dose:  150 mg    Instructions:  Take 1 tablet (150 mg total) by mouth once daily.     Begin Date    AM    Noon    PM    Bedtime       isosorbide mononitrate 60 MG 24 hr tablet   Commonly known as:  IMDUR   Refills:  0      Begin Date    AM    Noon    PM    Bedtime       lancets Misc   Commonly known as:  MICROLET LANCET   Quantity:  300 each   Refills:  3   Comments:  **Patient requests 90 day supply**    Instructions:  Use as directed for blood sugar management     Begin Date    AM    Noon    PM    Bedtime       LEVEMIR FLEXTOUCH 100 unit/mL (3 mL) Inpn pen   Quantity:  60 mL   Refills:  4   Comments:  **Patient requests 90 days supply**   Generic drug:  insulin detemir    Instructions:  INJECT 35 UNITS UNDER THE SKIN TWICE DAILY     Begin Date    AM    Noon    PM    Bedtime       lisinopril 20 MG tablet   Commonly known as:  PRINIVIL,ZESTRIL   Quantity:  90 tablet   Refills:  0    Instructions:  TAKE 1 TABLET BY MOUTH TWICE DAILY     Begin Date    AM    Noon    PM    Bedtime       metformin 1000 MG tablet   Commonly known as:  GLUCOPHAGE   Refills:  0   Dose:  1000 mg    Instructions:  Take 1,000 mg by mouth 2 (two) times daily with meals.     Begin Date    AM    Noon    PM    Bedtime       metoprolol succinate 25 MG 24 hr tablet   Commonly known as:  TOPROL-XL   Refills:  0   Dose:  25 mg    Instructions:  Take 25 mg by mouth once daily.     Begin Date    AM    Noon    PM    Bedtime       mupirocin 2 %  "ointment   Commonly known as:  BACTROBAN   Refills:  0      Begin Date    AM    Noon    PM    Bedtime       * MYRBETRIQ 50 mg Tb24   Refills:  5   Generic drug:  mirabegron    Instructions:  TK 1 T PO QD     Begin Date    AM    Noon    PM    Bedtime       * mirabegron 50 mg Tb24   Quantity:  30 tablet   Refills:  11   Dose:  50 mg    Instructions:  Take 1 tablet (50 mg total) by mouth once daily.     Begin Date    AM    Noon    PM    Bedtime       nitroGLYCERIN 0.4 MG SL tablet   Commonly known as:  NITROSTAT   Refills:  0   Dose:  0.4 mg    Instructions:  Place 0.4 mg under the tongue every 5 (five) minutes as needed for Chest pain.     Begin Date    AM    Noon    PM    Bedtime       oxybutynin 10 MG 24 hr tablet   Commonly known as:  DITROPAN-XL   Quantity:  90 tablet   Refills:  3   Dose:  10 mg   Comments:  **Patient requests 90 days supply**    Instructions:  Take 1 tablet (10 mg total) by mouth once daily.     Begin Date    AM    Noon    PM    Bedtime       pen needle, diabetic 32 gauge x 5/32" Ndle   Commonly known as:  BD ULTRA-FINE ENEIDA PEN NEEDLES   Quantity:  100 each   Refills:  12   Dose:  1 Syringe   Comments:  90 day supply    Instructions:  Inject 1 Syringe into the skin once daily.     Begin Date    AM    Noon    PM    Bedtime       simvastatin 20 MG tablet   Commonly known as:  ZOCOR   Refills:  3    Instructions:  TK 1 T PO ONCE A DAY     Begin Date    AM    Noon    PM    Bedtime       tramadol 50 mg tablet   Commonly known as:  ULTRAM   Refills:  0   Dose:  50 mg    Instructions:  Take 50 mg by mouth every 6 (six) hours as needed for Pain.     Begin Date    AM    Noon    PM    Bedtime       * Notice:  This list has 2 medication(s) that are the same as other medications prescribed for you. Read the directions carefully, and ask your doctor or other care provider to review them with you.               Please bring to all follow up appointments:    1. A copy of your discharge instructions.  2. All " "medicines you are currently taking in their original bottles.  3. Identification and insurance card.    Please arrive 15 minutes ahead of scheduled appointment time.    Please call 24 hours in advance if you must reschedule your appointment and/or time.        Your Scheduled Appointments     May 09, 2017  9:40 AM CDT   Established Patient Visit with Jud Silverman MD   [facility] ([facility])    [address] [facility] [address]  [place] [address] [phone]              Your Future Surgeries/Procedures     Apr 28, 2017   Surgery with Jud Silverman MD   [facility] ([facility])    [address] [place] [address] [phone]                  Discharge Instructions         Your surgery is scheduled for Friday April 28, 2017__.    Call [phone] between 2 p.m. and 5 p.m. on   __Thursday___ to find out your arrival time for the day of your surgery.      Please report to SAME DAY SURGERY UNIT on the 2nd FLOOR at _______ a.m.  Use front door entrance. The doors open at 0530 am.      If you need WHEELCHAIR assistance please call  [phone] from your cell phone or "[phone]"  from the  hospital courtesy phone located to the right after you enter the hospital lobby.      INSTRUCTIONS IMPORTANT!!!  ¨ Do not eat or drink after 12 midnight-including water. OK to brush teeth, no   gum, candy or mints!    ¨ Take only these medicines with a small swallow of water-morning of surgery.  Take Isosorbide and Metoprolol morning of surgery with swallow of water.          _x___  Prep instructions:    SHOWER    _x___  Do not wear makeup, including mascara. WEARING EYE MAKEUP MAY  LEAD TO SERIOUS EYE INJURY during surgery.  _x___  No powder, lotions or creams to your body.  __x__  You may wear only deodorant on the day of surgery.  __x__  Please remove all jewelry, including piercings and leave at home.  _x___  No money or valuables needed. Please leave at home.  You may " "bring your cell phone.  ____  Please bring any documents given by your doctor.  _x___  If going home the same day, arrange for a ride home. You will not be able to   drive if Anesthesia was used.  ____  Children under 18 years require a parent / guardian present the entire time   they are in surgery / recovery.  _x___  Wear loose fitting clothing. Allow for dressings, bandages.  _x___  Stop Aspirin, Ibuprofen, Motrin and Aleve at least 3-5 days before surgery, unless otherwise instructed by your doctor, or the nurse.              You MAY use Tylenol/acetaminophen until day of surgery.  ____  If you take diabetic medication, do not take am of surgery unless instructed by   Doctor.  __x__  Call MD for temperature above 101 degrees.        _x___ Stop taking any Fish Oil supplement or any Vitamins that contain Vitamin  E at least 5 days prior to surgery.          I have read or had read and explained to me, and understand the above information.  Additional comments or instructions:Please call   [phone] if you have any questions regarding the instructions above.                 Admission Information     Date & Time Provider Department CSN    4/24/2017  9:00 AM Jud Silverman MD [facility] [id number]      Care Providers     Provider Role Specialty Primary office phone    Jud Silverman MD Attending Provider Urology [phone]      Your Vitals Were     BP Pulse Temp Resp Height Weight    157/80 (BP Location: Left arm, Patient Position: Sitting, BP Method: Automatic) 91 97.6 °F (36.4 °C) (Oral) 16 5' 2" (1.575 m) 93.7 kg (206 lb 9.1 oz)    SpO2 BMI             97% 37.78 kg/m2         Recent Lab Values        7/8/2011 6/1/2012                       12:40 PM 11:52 AM          A1C 9.7 (H) 12.6 (H)                     Allergies as of 4/24/2017        Reactions    Sulfa (Sulfonamide Antibiotics)     Vicodin [Hydrocodone-acetaminophen]     Meclomen Rash      Advance Directives     An advance " directive is a document which, in the event you are no longer able to make decisions for yourself, tells your healthcare team what kind of treatment you do or do not want to receive, or who you would like to make those decisions for you.  If you do not currently have an advance directive, [facility] encourages you to create one.  For more information call:  [phone] ([phone]), [phone] ([phone]),  or log on to [email or web address].        Language Assistance Services     ATTENTION: Language assistance services are available, free of charge. Please call [phone].      ATENCIÓN: Si habla diaz, tiene a merchant disposición servicios gratuitos de asistencia lingüística. Llame al [phone].     CHÚ Ý: N?u b?n nói Ti?ng Vi?t, có các d?ch v? h? tr? ngôn ng? mi?n phí dành cho b?n. G?i s? [phone].        Diabetes Discharge Instructions                                   [facility] Sign-Up     Activating your [facility] account is as easy as 1-2-3!     1) Visit [email or web address], select Sign Up Now, enter this activation code and your date of birth, then select Next.  [email or web address]  Expires: 6/8/2017  2:06 PM      2) Create a username and password to use when you visit [facility] in the future and select a security question in case you lose your password and select Next.    3) Enter your e-mail address and click Sign Up!    Additional Information  If you have questions, please e-mail [email or web address] or call [phone] to talk to our [facility] staff. Remember, [facility] is NOT to be used for urgent needs. For medical emergencies, dial 911.          [facility] complies with applicable Federal civil rights laws and does not discriminate on the basis of race, color, national origin, age, disability, or sex.

## 2017-04-25 ENCOUNTER — TELEPHONE (OUTPATIENT)
Dept: UROLOGY | Facility: CLINIC | Age: 72
End: 2017-04-25

## 2017-04-25 NOTE — TELEPHONE ENCOUNTER
Spoke to the patient late afternoon yesterday- this is an old message that patient is referring to

## 2017-04-25 NOTE — TELEPHONE ENCOUNTER
----- Message from Nilamyanet Devine sent at 4/25/2017  8:08 AM CDT -----  Contact: self   Pt returning phone call. Please contact the pt at [phone]. Thanks!

## 2017-04-27 ENCOUNTER — TELEPHONE (OUTPATIENT)
Dept: UROLOGY | Facility: CLINIC | Age: 72
End: 2017-04-27

## 2017-04-27 ENCOUNTER — ANESTHESIA EVENT (OUTPATIENT)
Dept: SURGERY | Facility: HOSPITAL | Age: 72
End: 2017-04-27
Payer: MEDICARE

## 2017-04-27 NOTE — TELEPHONE ENCOUNTER
----- Message from Tracie Olmos sent at 4/27/2017  9:40 AM CDT -----  Contact: self  Please call pt in regards to upcoming procedure for 4/28. [phone]          Thanks

## 2017-04-27 NOTE — TELEPHONE ENCOUNTER
Called patient advised her to call us back at the office if no one has called her by 3 pm today with a time- advised to be NPO after midnight, but she could eat something today.

## 2017-04-27 NOTE — TELEPHONE ENCOUNTER
----- Message from Brittany Montelongo sent at 4/27/2017  2:37 PM CDT -----  Contact: [phone]  Pt is scheduled for surgery tomorrow and she is needing to know what time she needs to be at the hospital for Please call pt at your earliest convenience.  Thanks !

## 2017-04-28 ENCOUNTER — SURGERY (OUTPATIENT)
Age: 72
End: 2017-04-28

## 2017-04-28 ENCOUNTER — ANESTHESIA (OUTPATIENT)
Dept: SURGERY | Facility: HOSPITAL | Age: 72
End: 2017-04-28
Payer: MEDICARE

## 2017-04-28 ENCOUNTER — HOSPITAL ENCOUNTER (OUTPATIENT)
Facility: HOSPITAL | Age: 72
Discharge: HOME | End: 2017-04-28
Attending: UROLOGY | Admitting: UROLOGY
Payer: MEDICARE

## 2017-04-28 VITALS
SYSTOLIC BLOOD PRESSURE: 158 MMHG | TEMPERATURE: 98 F | DIASTOLIC BLOOD PRESSURE: 86 MMHG | WEIGHT: 206.56 LBS | OXYGEN SATURATION: 98 % | RESPIRATION RATE: 16 BRPM | HEART RATE: 91 BPM | HEIGHT: 62 IN | BODY MASS INDEX: 38.01 KG/M2

## 2017-04-28 DIAGNOSIS — N39.46 MIXED INCONTINENCE URGE AND STRESS: Primary | ICD-10-CM

## 2017-04-28 LAB — POCT GLUCOSE: 307 MG/DL (ref 70–110)

## 2017-04-28 PROCEDURE — 63600175 PHARM REV CODE 636 W HCPCS: Performed by: ANESTHESIOLOGY

## 2017-04-28 PROCEDURE — 25000003 PHARM REV CODE 250

## 2017-04-28 PROCEDURE — 37000009 HC ANESTHESIA EA ADD 15 MINS: Performed by: UROLOGY

## 2017-04-28 PROCEDURE — 25000003 PHARM REV CODE 250: Performed by: NURSE ANESTHETIST, CERTIFIED REGISTERED

## 2017-04-28 PROCEDURE — 37000008 HC ANESTHESIA 1ST 15 MINUTES: Performed by: UROLOGY

## 2017-04-28 PROCEDURE — 36000707: Performed by: UROLOGY

## 2017-04-28 PROCEDURE — D9220A PRA ANESTHESIA: Mod: ANES,,, | Performed by: ANESTHESIOLOGY

## 2017-04-28 PROCEDURE — 63600175 PHARM REV CODE 636 W HCPCS: Performed by: NURSE ANESTHETIST, CERTIFIED REGISTERED

## 2017-04-28 PROCEDURE — D9220A PRA ANESTHESIA: Mod: CRNA,,, | Performed by: NURSE ANESTHETIST, CERTIFIED REGISTERED

## 2017-04-28 PROCEDURE — 36000706: Performed by: UROLOGY

## 2017-04-28 PROCEDURE — 63600175 PHARM REV CODE 636 W HCPCS

## 2017-04-28 PROCEDURE — 52287 CYSTOSCOPY CHEMODENERVATION: CPT | Mod: ,,, | Performed by: UROLOGY

## 2017-04-28 PROCEDURE — 71000015 HC POSTOP RECOV 1ST HR: Performed by: UROLOGY

## 2017-04-28 PROCEDURE — 71000016 HC POSTOP RECOV ADDL HR: Performed by: UROLOGY

## 2017-04-28 PROCEDURE — 25000003 PHARM REV CODE 250: Performed by: ANESTHESIOLOGY

## 2017-04-28 PROCEDURE — 63600175 PHARM REV CODE 636 W HCPCS: Performed by: UROLOGY

## 2017-04-28 PROCEDURE — 25000003 PHARM REV CODE 250: Performed by: UROLOGY

## 2017-04-28 RX ORDER — FENTANYL CITRATE 50 UG/ML
25 INJECTION, SOLUTION INTRAMUSCULAR; INTRAVENOUS EVERY 5 MIN PRN
Status: DISCONTINUED | OUTPATIENT
Start: 2017-04-28 | End: 2017-04-28 | Stop reason: HOSPADM

## 2017-04-28 RX ORDER — LIDOCAINE HYDROCHLORIDE 10 MG/ML
1 INJECTION, SOLUTION EPIDURAL; INFILTRATION; INTRACAUDAL; PERINEURAL ONCE
Status: DISCONTINUED | OUTPATIENT
Start: 2017-04-28 | End: 2017-04-28 | Stop reason: HOSPADM

## 2017-04-28 RX ORDER — CARBOXYMETHYLCELLULOSE SODIUM 5 MG/ML
1 SOLUTION/ DROPS OPHTHALMIC DAILY PRN
COMMUNITY

## 2017-04-28 RX ORDER — CIPROFLOXACIN 2 MG/ML
400 INJECTION, SOLUTION INTRAVENOUS
Status: DISCONTINUED | OUTPATIENT
Start: 2017-04-28 | End: 2017-04-28 | Stop reason: HOSPADM

## 2017-04-28 RX ORDER — SODIUM CHLORIDE 0.9 % (FLUSH) 0.9 %
3 SYRINGE (ML) INJECTION EVERY 8 HOURS
Status: DISCONTINUED | OUTPATIENT
Start: 2017-04-28 | End: 2017-04-28 | Stop reason: HOSPADM

## 2017-04-28 RX ORDER — ACETAMINOPHEN 325 MG/1
650 TABLET ORAL EVERY 4 HOURS PRN
Status: DISCONTINUED | OUTPATIENT
Start: 2017-04-28 | End: 2017-04-28 | Stop reason: HOSPADM

## 2017-04-28 RX ORDER — GLYCOPYRROLATE 0.2 MG/ML
INJECTION INTRAMUSCULAR; INTRAVENOUS
Status: COMPLETED
Start: 2017-04-28 | End: 2017-04-28

## 2017-04-28 RX ORDER — OXYCODONE AND ACETAMINOPHEN 5; 325 MG/1; MG/1
1 TABLET ORAL EVERY 4 HOURS PRN
Qty: 6 TABLET | Refills: 0 | Status: SHIPPED | OUTPATIENT
Start: 2017-04-28

## 2017-04-28 RX ORDER — SODIUM CHLORIDE 9 MG/ML
INJECTION, SOLUTION INTRAVENOUS CONTINUOUS PRN
Status: DISCONTINUED | OUTPATIENT
Start: 2017-04-28 | End: 2017-04-28 | Stop reason: HOSPADM

## 2017-04-28 RX ORDER — PHENAZOPYRIDINE HYDROCHLORIDE 100 MG/1
100 TABLET, FILM COATED ORAL 3 TIMES DAILY PRN
Qty: 9 TABLET | Refills: 0 | Status: SHIPPED | OUTPATIENT
Start: 2017-04-28 | End: 2017-05-08

## 2017-04-28 RX ORDER — CIPROFLOXACIN 2 MG/ML
INJECTION, SOLUTION INTRAVENOUS
Status: COMPLETED
Start: 2017-04-28 | End: 2017-04-28

## 2017-04-28 RX ORDER — CIPROFLOXACIN 500 MG/1
500 TABLET ORAL 2 TIMES DAILY
Qty: 4 TABLET | Refills: 0 | Status: SHIPPED | OUTPATIENT
Start: 2017-04-28 | End: 2017-04-30

## 2017-04-28 RX ORDER — HYDROMORPHONE HYDROCHLORIDE 2 MG/ML
0.2 INJECTION, SOLUTION INTRAMUSCULAR; INTRAVENOUS; SUBCUTANEOUS EVERY 5 MIN PRN
Status: DISCONTINUED | OUTPATIENT
Start: 2017-04-28 | End: 2017-04-28 | Stop reason: HOSPADM

## 2017-04-28 RX ORDER — SODIUM CHLORIDE, SODIUM LACTATE, POTASSIUM CHLORIDE, CALCIUM CHLORIDE 600; 310; 30; 20 MG/100ML; MG/100ML; MG/100ML; MG/100ML
INJECTION, SOLUTION INTRAVENOUS CONTINUOUS
Status: DISCONTINUED | OUTPATIENT
Start: 2017-04-28 | End: 2017-04-28 | Stop reason: HOSPADM

## 2017-04-28 RX ORDER — HYDROCODONE BITARTRATE AND ACETAMINOPHEN 5; 325 MG/1; MG/1
1 TABLET ORAL EVERY 4 HOURS PRN
Status: DISCONTINUED | OUTPATIENT
Start: 2017-04-28 | End: 2017-04-28 | Stop reason: HOSPADM

## 2017-04-28 RX ORDER — MIDAZOLAM HYDROCHLORIDE 1 MG/ML
INJECTION INTRAMUSCULAR; INTRAVENOUS
Status: COMPLETED
Start: 2017-04-28 | End: 2017-04-28

## 2017-04-28 RX ORDER — SODIUM CHLORIDE 0.9 % (FLUSH) 0.9 %
3 SYRINGE (ML) INJECTION
Status: DISCONTINUED | OUTPATIENT
Start: 2017-04-28 | End: 2017-04-28 | Stop reason: HOSPADM

## 2017-04-28 RX ORDER — PROPOFOL 10 MG/ML
VIAL (ML) INTRAVENOUS
Status: DISCONTINUED | OUTPATIENT
Start: 2017-04-28 | End: 2017-04-28

## 2017-04-28 RX ORDER — LIDOCAINE HCL/PF 100 MG/5ML
SYRINGE (ML) INTRAVENOUS
Status: DISCONTINUED | OUTPATIENT
Start: 2017-04-28 | End: 2017-04-28

## 2017-04-28 RX ORDER — METOCLOPRAMIDE HYDROCHLORIDE 5 MG/ML
INJECTION INTRAMUSCULAR; INTRAVENOUS
Status: COMPLETED
Start: 2017-04-28 | End: 2017-04-28

## 2017-04-28 RX ORDER — ONDANSETRON 2 MG/ML
4 INJECTION INTRAMUSCULAR; INTRAVENOUS EVERY 12 HOURS PRN
Status: DISCONTINUED | OUTPATIENT
Start: 2017-04-28 | End: 2017-04-28 | Stop reason: HOSPADM

## 2017-04-28 RX ADMIN — PROPOFOL 20 MG: 10 INJECTION, EMULSION INTRAVENOUS at 09:04

## 2017-04-28 RX ADMIN — MIDAZOLAM HYDROCHLORIDE 2 MG: 1 INJECTION, SOLUTION INTRAMUSCULAR; INTRAVENOUS at 09:04

## 2017-04-28 RX ADMIN — PROPOFOL 30 MG: 10 INJECTION, EMULSION INTRAVENOUS at 09:04

## 2017-04-28 RX ADMIN — LIDOCAINE HYDROCHLORIDE 100 MG: 20 INJECTION, SOLUTION INTRAVENOUS at 09:04

## 2017-04-28 RX ADMIN — INSULIN HUMAN 7 UNITS: 100 INJECTION, SOLUTION PARENTERAL at 08:04

## 2017-04-28 RX ADMIN — SODIUM CHLORIDE, SODIUM LACTATE, POTASSIUM CHLORIDE, AND CALCIUM CHLORIDE: .6; .31; .03; .02 INJECTION, SOLUTION INTRAVENOUS at 07:04

## 2017-04-28 RX ADMIN — METOCLOPRAMIDE 10 MG: 5 INJECTION, SOLUTION INTRAMUSCULAR; INTRAVENOUS at 09:04

## 2017-04-28 RX ADMIN — ONABOTULINUMTOXINA 100 UNITS: 100 INJECTION, POWDER, LYOPHILIZED, FOR SOLUTION INTRADERMAL; INTRAMUSCULAR at 09:04

## 2017-04-28 RX ADMIN — CIPROFLOXACIN 400 MG: 2 INJECTION, SOLUTION INTRAVENOUS at 09:04

## 2017-04-28 RX ADMIN — SODIUM CHLORIDE 1000 ML: 0.9 INJECTION, SOLUTION INTRAVENOUS at 09:04

## 2017-04-28 RX ADMIN — GLYCOPYRROLATE 0.2 MG: 0.2 INJECTION, SOLUTION INTRAMUSCULAR; INTRAVENOUS at 09:04

## 2017-04-28 NOTE — ANESTHESIA POSTPROCEDURE EVALUATION
"Anesthesia Post Evaluation    Patient: Dilcia Orozco    Procedure(s) Performed: Procedure(s) (LRB):  INJECTION-BOTOX  100 units. (N/A)  CYSTOSCOPY (N/A)    Final Anesthesia Type: MAC  Patient location during evaluation: PACU  Patient participation: Yes- Able to Participate  Level of consciousness: awake and alert and oriented  Post-procedure vital signs: reviewed and stable  Pain management: adequate  Airway patency: patent  PONV status at discharge: No PONV  Anesthetic complications: no      Cardiovascular status: blood pressure returned to baseline and hemodynamically stable  Respiratory status: unassisted and spontaneous ventilation  Hydration status: euvolemic  Follow-up not needed.        Visit Vitals    BP (!) 158/86 (BP Location: Right arm, Patient Position: Sitting, BP Method: Automatic)    Pulse 91    Temp 36.6 °C (97.9 °F) (Oral)    Resp 16    Ht 5' 2" (1.575 m)    Wt 93.7 kg (206 lb 9.1 oz)    SpO2 98%    Breastfeeding No    BMI 37.78 kg/m2       Pain/Christina Score: Pain Assessment Performed: Yes (4/28/2017 11:35 AM)  Presence of Pain: denies (4/28/2017 11:35 AM)  Christina Score: 10 (4/28/2017 11:35 AM)  Modified Christina Score: 20 (4/28/2017 11:35 AM)      "

## 2017-04-28 NOTE — OP NOTE
"DATE OF PROCEDURE: 4/28/17     SURGEON: Jud Silverman M.D.     PREOPERATIVE DIAGNOSIS: Urge urinary incontinence.     POSTOPERATIVE DIAGNOSIS: Urge urinary incontinence.     PROCEDURE PERFORMED: Cystoscopic injection of Botox 100 units.     INDICATIONS FOR PROCEDURE: Dilcia Orozco is a 72 y.o. female who is an established patient who was referred by Dr Kohler for evaluation of incontinence.      She reports issues with UI for about 4-6 months. She reports she "leaks all the time." She is unable to express what makes her leak - likely combination of VIRGEN and UUI. 3ppd. Nocturia x 2. Denies UTI. Constipation occasionally. She has Myrbetriq listed in MAR but is unsure if she is taking this.      A1c (6/12) - 12.6. She has known DM2 with neuropathy.      PVR (bladder scan) last visit - 0cc, 15cc     She returns today reporting that symptoms are about the same. There is again some confusion if she is taking Ditropan as well as Myrbetriq. She seemed then to be only be taking Ditropan and not Myrbetriq (last visit was the opposite). Nocturia x 2. Continues to report UUI though difficult to explain UI episodes. She is taking both medications now without improvement.      Cysto/UDS 3/21/17:  Findings of the Procedure: Urgency with UUI at low volumes - ++DO/IBC. Difficult to fill bladder so low capacity on UDS. 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 Recommend Botox initially for UUI. Sling would be technically challenging. Consider bulking agent though will like still have VIRGEN. Pessary unlikely to work due to severe vaginal sensitivity.       DESCRIPTION OF PROCEDURE: Patient was brought to the Operating Room and placed under monitored anesthesia care. Full timeout procedures were performed identifying the correct patient and procedure. " Appropriate IV antibiotics with ciprofloxacin were given prior to commencement of surgery. The patient was placed in dorsal lithotomy position and prepped and draped in the usual sterile fashion.     A 22-Thai rigid scope was passed per urethra and into the bladder. Full   cystoscopy was performed that showed no abnormalities throughout the entirety of the bladder. Bilateral ureteral orifices were noted in their orthotopic position.   The cystoscopic needle was placed through the scope. Botox 100 units combined with 10 mL normal saline was injected at the appropriate depth into the detrusor muscle at increments of 0.5 mL or 5 units Botox each. Injections were done in systematic fashion in the right, left, and posterior bladder. Once the full 10 mL were injected, normal saline was used to flush the needle to ensure that all 100 units of Botox was injected. At the conclusion of the procedure, all injection sites were noted to be hemostatic. Fulguration was not deemed necessary. The bladder was then drained and the cystoscope was removed.     The patient was awakened from general anesthesia and transferred to the PACU in stable condition.     COMPLICATIONS: None.     FINDINGS: Cystoscopic injection of Botox 100 units. Normal cystoscopy.     ESTIMATED BLOOD LOSS: Less than 5 mL.     SPECIMENS: None.     DRAINS: None.     PATIENT DISPOSITION: The patient is stable for discharge home with plan for follow up in 1 week for a postvoid residual.

## 2017-04-28 NOTE — BRIEF OP NOTE
[facility]  Brief Operative Note     SUMMARY     Surgery Date: 4/28/2017     Surgeon(s) and Role:     * Jud Silverman MD - Primary    Assisting Surgeon: None    Pre-op Diagnosis:  Mixed incontinence urge and stress [N39.46]    Post-op Diagnosis:  Post-Op Diagnosis Codes:     * Mixed incontinence urge and stress [N39.46]    Procedure(s) (LRB):  INJECTION-BOTOX  100 units. (N/A)  CYSTOSCOPY (N/A)    Anesthesia: General    Description of the findings of the procedure: Cystoscopic injection of Botox 100u into detrusor muscle.     Findings/Key Components: Hemostasis noted at completion of procedure.    Estimated Blood Loss: <5cc         Specimens:   Specimen     None          Discharge Note    SUMMARY     Admit Date: 4/28/2017    Discharge Date and Time:  04/28/2017 9:48 AM    Hospital Course (synopsis of major diagnoses, care, treatment, and services provided during the course of the hospital stay): Uncomplicated cysto/Botox     Final Diagnosis: Post-Op Diagnosis Codes:     * Mixed incontinence urge and stress [N39.46]    Disposition: Home or Self Care    Follow Up/Patient Instructions:     Medications:  Reconciled Home Medications:   Current Discharge Medication List      START taking these medications    Details   ciprofloxacin HCl (CIPRO) 500 MG tablet Take 1 tablet (500 mg total) by mouth 2 (two) times daily.  Qty: 4 tablet, Refills: 0      oxycodone-acetaminophen (PERCOCET) 5-325 mg per tablet Take 1 tablet by mouth every 4 (four) hours as needed for Pain.  Qty: 6 tablet, Refills: 0      phenazopyridine (PYRIDIUM) 100 MG tablet Take 1 tablet (100 mg total) by mouth 3 (three) times daily as needed.  Qty: 9 tablet, Refills: 0         CONTINUE these medications which have NOT CHANGED    Details   carboxymethylcellulose sodium (REFRESH TEARS) 0.5 % Drop Place 1 drop into both eyes daily as needed (Dry Eyes.).      fluconazole (DIFLUCAN) 150 MG Tab Take 1 tablet (150 mg total) by mouth once  "daily.  Qty: 3 tablet, Refills: 0      insulin needles, disposable, (BD ULTRA-FINE ENEIDA PEN NEEDLES) 32 x 5/32 " Ndle Inject 1 Syringe into the skin once daily.  Qty: 100 each, Refills: 12    Comments: 90 day supply  Associated Diagnoses: Type II or unspecified type diabetes mellitus without mention of complication, not stated as uncontrolled      lancets (MICROLET LANCET) Misc Use as directed for blood sugar management  Qty: 300 each, Refills: 3    Comments: **Patient requests 90 day supply**  Associated Diagnoses: Type II or unspecified type diabetes mellitus without mention of complication, not stated as uncontrolled      LEVEMIR FLEXTOUCH 100 unit/mL (3 mL) InPn pen INJECT 35 UNITS UNDER THE SKIN TWICE DAILY  Qty: 60 mL, Refills: 4    Comments: **Patient requests 90 days supply**  Associated Diagnoses: DM type 2, uncontrolled, with neuropathy      mupirocin (BACTROBAN) 2 % ointment Apply 1 g topically 3 (three) times daily. To Left Great toe.      neomycin-bacitracin-polymyxin (NEOSPORIN) ointment Apply 1 g topically 2 (two) times daily. To Left Great toe.      aspirin 81 MG Chew Take 81 mg by mouth once daily.      isosorbide mononitrate (IMDUR) 60 MG 24 hr tablet Take 60 mg by mouth every morning.       lisinopril (PRINIVIL,ZESTRIL) 20 MG tablet TAKE 1 TABLET BY MOUTH TWICE DAILY  Qty: 90 tablet, Refills: 0      metformin (GLUCOPHAGE) 1000 MG tablet Take 1,000 mg by mouth 2 (two) times daily with meals.      metoprolol succinate (TOPROL-XL) 25 MG 24 hr tablet Take 25 mg by mouth once daily.      !! mirabegron 50 mg Tb24 Take 1 tablet (50 mg total) by mouth once daily.  Qty: 30 tablet, Refills: 11      !! MYRBETRIQ 50 mg Tb24 TK 1 T PO QD  Refills: 5      nitroGLYCERIN (NITROSTAT) 0.4 MG SL tablet Place 0.4 mg under the tongue every 5 (five) minutes as needed for Chest pain.      oxybutynin (DITROPAN-XL) 10 MG 24 hr tablet Take 1 tablet (10 mg total) by mouth once daily.  Qty: 90 tablet, Refills: 3    Comments: " **Patient requests 90 days supply**      simvastatin (ZOCOR) 20 MG tablet TK 1 T PO ONCE A DAY  Refills: 3      tramadol (ULTRAM) 50 mg tablet Take 50 mg by mouth every 6 (six) hours as needed for Pain.       !! - Potential duplicate medications found. Please discuss with provider.          Discharge Procedure Orders  Diet general     Activity as tolerated     Call MD for:  temperature >100.4     Call MD for:  persistent nausea and vomiting     Call MD for:  severe uncontrolled pain     Call MD for:  difficulty breathing, headache or visual disturbances     No dressing needed       Follow-up Information     Follow up with Jud Silverman MD In 1 week.    Specialty:  Urology    Why:  For post-op follow up    Contact information:    [address] [place] [id number] [phone]

## 2017-04-28 NOTE — ANESTHESIA PREPROCEDURE EVALUATION
04/28/2017  Dilcia Orozco is a 72 y.o., female.    Anesthesia Evaluation    I have reviewed the Patient Summary Reports.    I have reviewed the Nursing Notes.      Review of Systems  Anesthesia Hx:  No problems with previous Anesthesia    Social:  Non-Smoker    Hematology/Oncology:        Hematology Comments: Platelets 89   Cardiovascular:   Exercise tolerance: poor Denies Pacemaker. Hypertension Valvular problems/Murmurs (mild-moderate AS), AS CAD   CABG/stent (LAD stent)  Denies Dysrhythmias.   Denies Angina.             PVD hyperlipidemia FLORENTINO Intermediate cards risk    EF 65%  Mild AS (1.6cm emeka)         Pulmonary:  Pulmonary Normal    Renal/:   Denies Chronic Renal Disease.     Hepatic/GI:  Hepatic/GI Normal    Neurological:   Denies CVA. Denies Seizures.    Endocrine:   Diabetes, poorly controlled, type 2 Denies Hypothyroidism. Denies Hyperthyroidism.        Physical Exam  General:  Obesity    Airway/Jaw/Neck:   M2  Good mouth opening  Denies loose dentition  Moderate neck ROM          Mental Status:  Mental Status Findings: Normal        Anesthesia Plan  Type of Anesthesia, risks & benefits discussed:  Anesthesia Type:  MAC, general  Patient's Preference:   Intra-op Monitoring Plan:   Intra-op Monitoring Plan Comments:   Post Op Pain Control Plan:   Post Op Pain Control Plan Comments:   Induction:   IV  Beta Blocker:  Patient is not currently on a Beta-Blocker (No further documentation required).       Informed Consent: Patient understands risks and agrees with Anesthesia plan.  Questions answered. Anesthesia consent signed with patient.  ASA Score: 3     Day of Surgery Review of History & Physical:    H&P update referred to the surgeon.     Anesthesia Plan Notes: Npo  May do mac for this short procedure or GA w/ LMA  Pt has mild AS so would treat hypotension aggressively with  phenylephrine and avoid large doses of propofol        Ready For Surgery From Anesthesia Perspective.

## 2017-04-28 NOTE — DISCHARGE INSTRUCTIONS
Expect blood and/or burning with urination. Drink plenty of fluids.    ***  BATHING/DRESSING:  ? Ok to shower tomorrow    ACTIVITY LEVEL: If you have received sedation or an anesthetic, you may feel sleepy for several hours. Rest until you are more awake. Gradually resume your normal activities.    No heavy lifting.      DIET: You may resume your home diet. If nausea is present, increase your diet gradually with fluids and bland foods.  Medications: Pain medication should be taken only if needed and as directed. If antibiotics are prescribed, the medication should be taken until completed. You will be given an updated list of you medications.  ? No driving, alcoholic beverages or signing legal documents for next 24 hours or while taking pain medication    CALL THE DOCTOR:    For any obvious bleeding (some dried blood over the incision is normal).    Some blood in your urine is normal.     Redness, swelling, foul smell around incision or fever over 101.   Shortness of breath, Coughing Up Bloody Sputum, or Pains or Swelling in your Calves..   Persistent pain or nausea not relieved by medication.   Problems urinating - unable to urinate or heavy bleeding (with our without clots) in urine.    If any unusual problems or difficulties occur contact your doctor. If you cannot contact your doctor but feel your signs and symptoms warrant a physicians attention return to the emergency room.       PLEASE CONTACT AND MAKE A VISIT WITH YOUR PRIMARY CARE PHYSICIAN REGARDING DIABETIC MANAGEMENT.    Fall Prevention  Millions of people fall every year and injure themselves. You may have had anesthesia or sedation which may increase your risk of falling. You may have health issues that put you at an increased risk of falling.     Here are ways to reduce your risk of falling.  ·   · Make your home safe by keeping walkways clear of objects you may trip over.  · Use non-slip pads under rugs. Do not use area rugs or small throw  rugs.  · Use non-slip mats in bathtubs and showers.  · Install handrails and lights on staircases.  · Do not walk in poorly lit areas.  · Do not stand on chairs or wobbly ladders.  · Use caution when reaching overhead or looking upward. This position can cause a loss of balance.  · Be sure your shoes fit properly, have non-slip bottoms and are in good condition.   · Wear shoes both inside and out. Avoid going barefoot or wearing slippers.  · Be cautious when going up and down stairs, curbs, and when walking on uneven sidewalks.  · If your balance is poor, consider using a cane or walker.  · If your fall was related to alcohol use, stop or limit alcohol intake.   · If your fall was related to use of sleeping medicines, talk to your doctor about this. You may need to reduce your dosage at bedtime if you awaken during the night to go to the bathroom.    · To reduce the need for nighttime bathroom trips:  ¨ Avoid drinking fluids for several hours before going to bed  ¨ Empty your bladder before going to bed  ¨ Men can keep a urinal at the bedside  · Stay as active as you can. Balance, flexibility, strength, and endurance all come from exercise. They all play a role in preventing falls. Ask your healthcare provider which types of activity are right for you.  · Get your vision checked on a regular basis.  · If you have pets, know where they are before you stand up or walk so you don't trip over them.  · Use night lights.

## 2017-04-28 NOTE — TRANSFER OF CARE
"Anesthesia Transfer of Care Note    Patient: Dilcia Orozco    Procedure(s) Performed: Procedure(s) (LRB):  INJECTION-BOTOX  100 units. (N/A)  CYSTOSCOPY (N/A)    Patient location: [facility]    Anesthesia Type: MAC    Transport from OR: Transported from OR on room air with adequate spontaneous ventilation    Post pain: adequate analgesia    Post assessment: no apparent anesthetic complications    Post vital signs: stable    Level of consciousness: awake, alert and oriented    Nausea/Vomiting: no nausea/vomiting    Complications: none          Last vitals:   Visit Vitals    BP (!) 145/68 (BP Location: Left arm, Patient Position: Lying, BP Method: Automatic)    Pulse 109    Temp 36.6 °C (97.9 °F) (Oral)    Resp 18    Ht 5' 2" (1.575 m)    Wt 93.7 kg (206 lb 9.1 oz)    SpO2 97%    Breastfeeding No    BMI 37.78 kg/m2     "

## 2017-05-02 ENCOUNTER — TELEPHONE (OUTPATIENT)
Dept: UROLOGY | Facility: CLINIC | Age: 72
End: 2017-05-02

## 2017-05-02 NOTE — TELEPHONE ENCOUNTER
Patient wants to know if it is safe to travel to [place] this soon after surgery- She plans to leave Friday. Please advise on any travel restrictions after botox. Thank you

## 2017-05-02 NOTE — TELEPHONE ENCOUNTER
----- Message from Yanira Loya sent at 5/2/2017  4:28 PM CDT -----  Contact: Self/[phone]  Patient would like to speak to the staff. Patient did not want to leave a detailed message. Thank you.

## 2017-05-03 ENCOUNTER — TELEPHONE (OUTPATIENT)
Dept: UROLOGY | Facility: CLINIC | Age: 72
End: 2017-05-03

## 2017-05-09 ENCOUNTER — OFFICE VISIT (OUTPATIENT)
Dept: UROLOGY | Facility: CLINIC | Age: 72
End: 2017-05-09
Payer: MEDICARE

## 2017-05-09 VITALS
HEART RATE: 68 BPM | SYSTOLIC BLOOD PRESSURE: 100 MMHG | RESPIRATION RATE: 14 BRPM | HEIGHT: 62 IN | DIASTOLIC BLOOD PRESSURE: 60 MMHG | WEIGHT: 207.25 LBS | BODY MASS INDEX: 38.14 KG/M2

## 2017-05-09 DIAGNOSIS — N39.46 MIXED INCONTINENCE URGE AND STRESS: Primary | ICD-10-CM

## 2017-05-09 PROCEDURE — 1159F MED LIST DOCD IN RCRD: CPT | Mod: S$GLB,,, | Performed by: UROLOGY

## 2017-05-09 PROCEDURE — 3074F SYST BP LT 130 MM HG: CPT | Mod: S$GLB,,, | Performed by: UROLOGY

## 2017-05-09 PROCEDURE — 3078F DIAST BP <80 MM HG: CPT | Mod: S$GLB,,, | Performed by: UROLOGY

## 2017-05-09 PROCEDURE — 99214 OFFICE O/P EST MOD 30 MIN: CPT | Mod: S$GLB,,, | Performed by: UROLOGY

## 2017-05-09 PROCEDURE — 99999 PR PBB SHADOW E&M-EST. PATIENT-LVL III: CPT | Mod: PBBFAC,,, | Performed by: UROLOGY

## 2017-05-09 PROCEDURE — 1160F RVW MEDS BY RX/DR IN RCRD: CPT | Mod: S$GLB,,, | Performed by: UROLOGY

## 2017-05-09 PROCEDURE — 1126F AMNT PAIN NOTED NONE PRSNT: CPT | Mod: S$GLB,,, | Performed by: UROLOGY

## 2017-05-09 NOTE — PROGRESS NOTES
"  Subjective:       Dilcia Orozco is a 72 y.o. female who is an established patient who was referred by Dr Kohler for evaluation of incontinence.      She reports issues with UI for about 4-6 months. She reports she "leaks all the time." She is unable to express what makes her leak - likely combination of VIRGEN and UUI. 3ppd. Nocturia x 2. Denies UTI. Constipation occasionally. She has Myrbetriq listed in MAR but is unsure if she is taking this.     A1c (6/12) - 12.6. She has known DM2 with neuropathy.  She again had issues with hypoglycemia today in clinic (she takes DM meds but does not eat breakfast).     PVR (bladder scan) last visit - 0cc, 15cc    She returns today reporting that symptoms are about the same. There is again some confusion if she is taking Ditropan as well as Myrbetriq. She seemed then to be only be taking Ditropan and not Myrbetriq (last visit was the opposite). Nocturia x 2. Continues to report UUI though difficult to explain UI episodes. She is taking both medications now without improvement.     I see her  as a patient as well (PCa treated by [facility]).     Cysto/UDS 3/21/17:  Findings of the Procedure: Urgency with UUI at low volumes - ++DO/IBC. Difficult to fill bladder so low capacity on UDS. 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 Recommend Botox initially for UUI. Sling would be technically challenging. Consider bulking agent though will like still have VIRGEN. Pessary unlikely to work due to severe vaginal sensitivity.    She is now s/p Btoox 100u on 4/28/17. She has much less leakage and frequency. She is vastly improved. Nocturia only x 1 (rather than 3-4).    PVR (bladder scan) today - 20cc      The following portions of the patient's history were reviewed and updated as appropriate: allergies, current " "medications, past family history, past medical history, past social history, past surgical history and problem list.    Review of Systems  Constitutional: no fever or chills  ENT: no nasal congestion or sore throat  Respiratory: no cough or shortness of breath  Cardiovascular: no chest pain or palpitations  Gastrointestinal: no nausea or vomiting, tolerating diet  Genitourinary: as per HPI  Hematologic/Lymphatic: no easy bruising or lymphadenopathy  Musculoskeletal: no arthralgias or myalgias  Skin: no rashes or lesions  Neurological: no seizures or tremors  Behavioral/Psych: no auditory or visual hallucinations       Objective:    Vitals:   /60  Pulse 68  Resp 14  Ht 5' 2" (1.575 m)  Wt 94 kg (207 lb 3.7 oz)  BMI 37.9 kg/m2    Physical Exam   General: well developed, well nourished in no acute distress  Head: normocephalic, atraumatic  Neck: supple, trachea midline, no obvious enlargement of thyroid  HEENT: EOMI, mucus membranes moist, sclera anicteric, no hearing impairment  Lungs: symmetric expansion, non-labored breathing  Cardiovascular: regular rate and rhythm, normal pulses  Abdomen: soft, non tender, non distended, no palpable masses, no hepatosplenomegaly, no hernias, no CVA tenderness  Musculoskeletal: no peripheral edema, normal ROM in bilateral upper and lower extremities  Lymphatics: no cervical or inguinal lymphadenopathy  Skin: no rashes or lesions  Neuro: alert and oriented x 3, no gross deficits  Psych: normal judgment and insight, normal mood/affect and non-anxious  Genitourinary:   patient declined exam      Lab Review   Urine analysis today in clinic shows positive glucose 500    Lab Results   Component Value Date    WBC 5.08 04/24/2017    HGB 13.7 04/24/2017    HCT 39.8 04/24/2017    MCV 92 04/24/2017    PLT 89 (L) 04/24/2017     Lab Results   Component Value Date    CREATININE 1.0 04/24/2017    BUN 7 (L) 04/24/2017       Imaging  NA       Assessment/Plan:      1. Mixed incontinence " urge and stress    - Discussed difference of UUI and VIRGEN components. Reviewed etiology and workup of each.   - VIRGEN: Kegels, PFPT, bulking agent, MUS.   - UUI: Behavioral changes, PFPT, anticholinergics. Botox/InterStim for refractory UUI.   - Discussed importance of glucose control. Polyuria will make UI worse.   - Weight loss can also improve situation.   - Confusion as to what medication she is actually taking - seems to be taking both now (brought in all meds today)    - On Ditropan and Myrbetriq - still with sig UI   - Remains with DM issues   - Significant glucosuria that is likely contributing. No recent A1c in chart.    - s/p cysto/UDS 3/21/17   - s/p cysto/Botox 100u on 4/28/17. Doing great. PVR acceptable.             Follow up in 6 months

## 2017-05-09 NOTE — MR AVS SNAPSHOT
[facility]  [address]  [place] [address]  Phone: [phone]                  Dilcia Orozco   2017 9:40 AM   Office Visit    Description:  Female : 1945   Provider:  Jud Silverman MD   Department:  [facility]           Reason for Visit     Follow-up           Diagnoses this Visit        Comments    Mixed incontinence urge and stress    -  Primary            To Do List           Goals (5 Years of Data)              Today    17    Blood Pressure < 140/90   100/60  100/60  100/60    HEMOGLOBIN A1C < 7.0           LDL CHOLESTEROL < 100             Follow-Up and Disposition     Return in about 6 months (around 2017) for Follow up on symptoms.      [facility]     [facility] Nurse Care Line -  Assistance  Unless otherwise directed by your provider, please contact [facility], our nurse care line that is available for  assistance.     Registered nurses in the [facility] provide: appointment scheduling, clinical advisement, health education, and other advisory services.  Call: [phone] (toll free)               Medications           Message regarding Medications     Verify the changes and/or additions to your medication regime listed below are the same as discussed with your clinician today.  If any of these changes or additions are incorrect, please notify your healthcare provider.             Verify that the below list of medications is an accurate representation of the medications you are currently taking.  If none reported, the list may be blank. If incorrect, please contact your healthcare provider. Carry this list with you in case of emergency.           Current Medications     aspirin 81 MG Chew Take 81 mg by mouth once daily.    carboxymethylcellulose sodium (REFRESH TEARS) 0.5 % Drop Place 1 drop into both eyes daily as needed (Dry Eyes.).    fluconazole (DIFLUCAN) 150 MG Tab  "Take 1 tablet (150 mg total) by mouth once daily.    insulin needles, disposable, (BD ULTRA-FINE ENEIDA PEN NEEDLES) 32 x 5/32 " Ndle Inject 1 Syringe into the skin once daily.    isosorbide mononitrate (IMDUR) 60 MG 24 hr tablet Take 60 mg by mouth every morning.     lancets (MICROLET LANCET) Misc Use as directed for blood sugar management    LEVEMIR FLEXTOUCH 100 unit/mL (3 mL) InPn pen INJECT 35 UNITS UNDER THE SKIN TWICE DAILY    lisinopril (PRINIVIL,ZESTRIL) 20 MG tablet TAKE 1 TABLET BY MOUTH TWICE DAILY    metformin (GLUCOPHAGE) 1000 MG tablet Take 1,000 mg by mouth 2 (two) times daily with meals.    metoprolol succinate (TOPROL-XL) 25 MG 24 hr tablet Take 25 mg by mouth once daily.    mirabegron 50 mg Tb24 Take 1 tablet (50 mg total) by mouth once daily.    mupirocin (BACTROBAN) 2 % ointment Apply 1 g topically 3 (three) times daily. To Left Great toe.    MYRBETRIQ 50 mg Tb24 TK 1 T PO QD    neomycin-bacitracin-polymyxin (NEOSPORIN) ointment Apply 1 g topically 2 (two) times daily. To Left Great toe.    nitroGLYCERIN (NITROSTAT) 0.4 MG SL tablet Place 0.4 mg under the tongue every 5 (five) minutes as needed for Chest pain.    oxybutynin (DITROPAN-XL) 10 MG 24 hr tablet Take 1 tablet (10 mg total) by mouth once daily.    oxycodone-acetaminophen (PERCOCET) 5-325 mg per tablet Take 1 tablet by mouth every 4 (four) hours as needed for Pain.    simvastatin (ZOCOR) 20 MG tablet TK 1 T PO ONCE A DAY    tramadol (ULTRAM) 50 mg tablet Take 50 mg by mouth every 6 (six) hours as needed for Pain.           Clinical Reference Information           Your Vitals Were     BP Pulse Resp Height Weight BMI    100/60 68 14 5' 2" (1.575 m) 94 kg (207 lb 3.7 oz) 37.9 kg/m2      Blood Pressure          Most Recent Value    BP  100/60      Allergies as of 5/9/2017     Bactrim [Sulfamethoxazole-trimethoprim]    Sulfa (Sulfonamide Antibiotics)    Vicodin [Hydrocodone-acetaminophen]    Meclomen      Immunizations Administered on Date of " Encounter - 5/9/2017     None      Orders Placed During Today's Visit     Future Labs/Procedures Expected by Expires    Bladder scan  As directed 5/13/2017      [facility] Sign-Up     Activating your [facility] account is as easy as 1-2-3!     1) Visit [email or web address], select Sign Up Now, enter this activation code and your date of birth, then select Next.  [email or web address]  Expires: 6/8/2017  2:06 PM      2) Create a username and password to use when you visit [facility] in the future and select a security question in case you lose your password and select Next.    3) Enter your e-mail address and click Sign Up!    Additional Information  If you have questions, please e-mail [email or web address] or call [phone] to talk to our [facility] staff. Remember, [facility] is NOT to be used for urgent needs. For medical emergencies, dial 911.         Language Assistance Services     ATTENTION: Language assistance services are available, free of charge. Please call [phone].      ATENCIÓN: Si habla español, tiene a merchant disposición servicios gratuitos de asistencia lingüística. Llame al [phone].     CHÚ Ý: N?u b?n nói Ti?ng Vi?t, có các d?ch v? h? tr? ngôn ng? mi?n phí dành cho b?n. G?i s? [phone].         [facility] complies with applicable Federal civil rights laws and does not discriminate on the basis of race, color, national origin, age, disability, or sex.

## 2017-10-11 ENCOUNTER — TELEPHONE (OUTPATIENT)
Dept: UROLOGY | Facility: CLINIC | Age: 72
End: 2017-10-11

## 2017-10-11 NOTE — TELEPHONE ENCOUNTER
----- Message from Kary Toledo sent at 10/11/2017  9:42 AM CDT -----  Contact: Mr. Orozco  Patient has a bad odor would like to bring patient in before 10/30/2017. Mr. Orozco can be reached at [phone].        Thanks,

## 2017-10-11 NOTE — TELEPHONE ENCOUNTER
Spoke to  advised for pt to drink more water since she is not drinking a lot of fluids. Pt also states no burning,fever,pain or chills just urine smell fouls. appt was made for patient on 10/13/17 for 1pm./arnol

## 2017-10-13 ENCOUNTER — OFFICE VISIT (OUTPATIENT)
Dept: UROLOGY | Facility: CLINIC | Age: 72
End: 2017-10-13
Payer: MEDICARE

## 2017-10-13 VITALS — BODY MASS INDEX: 38.09 KG/M2 | WEIGHT: 207 LBS | RESPIRATION RATE: 14 BRPM | HEIGHT: 62 IN

## 2017-10-13 DIAGNOSIS — N76.0 ACUTE VAGINITIS: Primary | ICD-10-CM

## 2017-10-13 DIAGNOSIS — R82.90 MALODOROUS URINE: ICD-10-CM

## 2017-10-13 LAB
BILIRUB SERPL-MCNC: NORMAL MG/DL
BLOOD URINE, POC: NORMAL
COLOR, POC UA: YELLOW
GLUCOSE UR QL STRIP: NORMAL
KETONES UR QL STRIP: NORMAL
LEUKOCYTE ESTERASE URINE, POC: NORMAL
NITRITE, POC UA: NORMAL
PH, POC UA: 5
POC RESIDUAL URINE VOLUME: 0 ML (ref 0–100)
PROTEIN, POC: NORMAL
SPECIFIC GRAVITY, POC UA: 1000
UROBILINOGEN, POC UA: 8

## 2017-10-13 PROCEDURE — 99214 OFFICE O/P EST MOD 30 MIN: CPT | Mod: 25,S$GLB,, | Performed by: NURSE PRACTITIONER

## 2017-10-13 PROCEDURE — 81001 URINALYSIS AUTO W/SCOPE: CPT | Mod: S$GLB,,, | Performed by: NURSE PRACTITIONER

## 2017-10-13 PROCEDURE — 51798 US URINE CAPACITY MEASURE: CPT | Mod: S$GLB,,, | Performed by: NURSE PRACTITIONER

## 2017-10-13 PROCEDURE — 99999 PR PBB SHADOW E&M-EST. PATIENT-LVL IV: CPT | Mod: PBBFAC,,, | Performed by: NURSE PRACTITIONER

## 2017-10-13 RX ORDER — FLUCONAZOLE 150 MG/1
150 TABLET ORAL DAILY
Qty: 3 TABLET | Refills: 0 | Status: SHIPPED | OUTPATIENT
Start: 2017-10-13 | End: 2017-10-14

## 2017-10-13 NOTE — PROGRESS NOTES
"Subjective:       Patient ID: Dilcia Orozco is a 72 y.o. female who is an established patient of Dr. Silverman though new to me was last seen in this office 5/9/2017    Chief Complaint:   Chief Complaint   Patient presents with    Follow-up     patient is here for possible uti     Patient has hx of UI for which she sees Dr. Silverman. 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 It was recommended she undergo Botox injections. She is now s/p Botox 100u on 4/28/17. She reports significant improvement in urinary symptoms since procedure.    Today she presents with c/o malodorous/concentrated urine, vaginal itching and discharge x 1 week. She describes vaginal discharge as white and "clumpy". There are no alleviating or aggravating factors. She denies dysuria, hematuria, abdominal/flank pain or fever. She has hx of DM2 and reports blood glucose range from 160-200s. Last HgbA1c (6/2012)--12.6    PVR (bladder scan) today - 0 ml    ACTIVE MEDICAL ISSUES:  Patient Active Problem List   Diagnosis    DM type 2, uncontrolled, with neuropathy    Essential hypertension    Hyperlipidemia    Morbid obesity with BMI of 40.0-44.9, adult    Long term current use of insulin    Mixed incontinence urge and stress       ALLERGIES AND MEDICATIONS: updated and reviewed.  Review of patient's allergies indicates:   Allergen Reactions    Bactrim [sulfamethoxazole-trimethoprim]     Sulfa (sulfonamide antibiotics)     Vicodin [hydrocodone-acetaminophen]     Meclomen Rash     Current Outpatient Prescriptions   Medication Sig    aspirin 81 MG Chew Take 81 mg by mouth once daily.    carboxymethylcellulose sodium (REFRESH TEARS) 0.5 % Drop Place 1 drop into both eyes daily as needed (Dry Eyes.).    insulin needles, disposable, (BD ULTRA-FINE ENEIDA PEN NEEDLES) 32 x 5/32 " Ndle Inject 1 Syringe into the skin once daily.    isosorbide " mononitrate (IMDUR) 60 MG 24 hr tablet Take 60 mg by mouth every morning.     lancets (MICROLET LANCET) Misc Use as directed for blood sugar management    LEVEMIR FLEXTOUCH 100 unit/mL (3 mL) InPn pen INJECT 35 UNITS UNDER THE SKIN TWICE DAILY    lisinopril (PRINIVIL,ZESTRIL) 20 MG tablet TAKE 1 TABLET BY MOUTH TWICE DAILY    metformin (GLUCOPHAGE) 1000 MG tablet Take 1,000 mg by mouth 2 (two) times daily with meals.    metoprolol succinate (TOPROL-XL) 25 MG 24 hr tablet Take 25 mg by mouth once daily.    mirabegron 50 mg Tb24 Take 1 tablet (50 mg total) by mouth once daily.    mupirocin (BACTROBAN) 2 % ointment Apply 1 g topically 3 (three) times daily. To Left Great toe.    MYRBETRIQ 50 mg Tb24 TK 1 T PO QD    neomycin-bacitracin-polymyxin (NEOSPORIN) ointment Apply 1 g topically 2 (two) times daily. To Left Great toe.    nitroGLYCERIN (NITROSTAT) 0.4 MG SL tablet Place 0.4 mg under the tongue every 5 (five) minutes as needed for Chest pain.    oxybutynin (DITROPAN-XL) 10 MG 24 hr tablet Take 1 tablet (10 mg total) by mouth once daily.    oxycodone-acetaminophen (PERCOCET) 5-325 mg per tablet Take 1 tablet by mouth every 4 (four) hours as needed for Pain.    simvastatin (ZOCOR) 20 MG tablet TK 1 T PO ONCE A DAY    tramadol (ULTRAM) 50 mg tablet Take 50 mg by mouth every 6 (six) hours as needed for Pain.    fluconazole (DIFLUCAN) 150 MG Tab Take 1 tablet (150 mg total) by mouth once daily. 150 mg PO q 72 hrs x 2 doses     Current Facility-Administered Medications   Medication    onabotulinumtoxina injection 100 Units       Review of Systems   Constitutional: Negative for activity change, chills, fatigue, fever and unexpected weight change.   Eyes: Negative for discharge, redness and visual disturbance.   Respiratory: Negative for cough, shortness of breath and wheezing.    Cardiovascular: Negative for chest pain and leg swelling.   Gastrointestinal: Negative for abdominal distention, abdominal pain,  "constipation, diarrhea, nausea and vomiting.   Genitourinary: Positive for vaginal discharge. Negative for difficulty urinating, dysuria, flank pain, frequency, hematuria, pelvic pain, urgency and vaginal pain.        +vaginal itching  + malodorous/concentrated urine   Musculoskeletal: Negative for arthralgias, joint swelling and myalgias.   Skin: Negative for color change and rash.   Neurological: Negative for dizziness and light-headedness.   Psychiatric/Behavioral: Negative for behavioral problems and confusion. The patient is not nervous/anxious.        Objective:      Vitals:    10/13/17 1448   Resp: 14   Weight: 93.9 kg (207 lb)   Height: 5' 2" (1.575 m)     Physical Exam   Constitutional: She is oriented to person, place, and time. She appears well-developed.   HENT:   Head: Normocephalic and atraumatic.   Nose: Nose normal.   Eyes: Conjunctivae are normal. Right eye exhibits no discharge. Left eye exhibits no discharge.   Neck: Normal range of motion. Neck supple. No tracheal deviation present. No thyromegaly present.   Cardiovascular: Normal rate and regular rhythm.    Pulmonary/Chest: Effort normal. No respiratory distress. She has no wheezes.   Abdominal: Soft. She exhibits no distension. There is no hepatosplenomegaly. There is no tenderness. There is no CVA tenderness. No hernia.   Genitourinary:   Genitourinary Comments: Patient declined exam   Musculoskeletal: Normal range of motion. She exhibits no edema.   Neurological: She is alert and oriented to person, place, and time.   Skin: Skin is warm and dry. No rash noted. No erythema.     Psychiatric: She has a normal mood and affect. Her behavior is normal. Judgment normal.       Urine dipstick shows 1000 glucose      Assessment:       1. Acute vaginitis    2. Malodorous urine          Plan:       1. Acute vaginitis  -Likely related to poorly controlled DM2 and glucosuria  - fluconazole (DIFLUCAN) 150 MG Tab; Take 1 tablet (150 mg total) by mouth once " daily. 150 mg PO q 72 hrs x 2 doses  Dispense: 3 tablet; Refill: 0  -Encouraged tight glucose control and f/u with PCP    2. Malodorous urine  -UA not concerning for infection in addition to the absence of irritative voiding ssx, will hold on urine cx for now  -Adequate fluid intake  - POCT urinalysis, dipstick or tablet reag  - POCT Bladder Scan            Return in about 5 weeks (around 11/14/2017) for w/ Dr. Silverman as scheduled.

## 2017-10-30 RX ORDER — OXYBUTYNIN CHLORIDE 10 MG/1
TABLET, EXTENDED RELEASE ORAL
Qty: 90 TABLET | Refills: 3 | Status: SHIPPED | OUTPATIENT
Start: 2017-10-30

## 2017-11-14 ENCOUNTER — OFFICE VISIT (OUTPATIENT)
Dept: UROLOGY | Facility: CLINIC | Age: 72
End: 2017-11-14
Payer: MEDICARE

## 2017-11-14 VITALS
WEIGHT: 207.25 LBS | HEIGHT: 62 IN | HEART RATE: 60 BPM | SYSTOLIC BLOOD PRESSURE: 112 MMHG | RESPIRATION RATE: 14 BRPM | DIASTOLIC BLOOD PRESSURE: 56 MMHG | BODY MASS INDEX: 38.14 KG/M2

## 2017-11-14 DIAGNOSIS — N39.46 MIXED INCONTINENCE URGE AND STRESS: Primary | ICD-10-CM

## 2017-11-14 DIAGNOSIS — R81 GLUCOSURIA: ICD-10-CM

## 2017-11-14 PROCEDURE — 99214 OFFICE O/P EST MOD 30 MIN: CPT | Mod: S$GLB,,, | Performed by: UROLOGY

## 2017-11-14 PROCEDURE — 99999 PR PBB SHADOW E&M-EST. PATIENT-LVL III: CPT | Mod: PBBFAC,,, | Performed by: UROLOGY

## 2017-11-14 RX ORDER — METFORMIN HYDROCHLORIDE 500 MG/1
TABLET ORAL
COMMUNITY
Start: 2017-11-08

## 2017-11-14 RX ORDER — ISOSORBIDE MONONITRATE 30 MG/1
TABLET, EXTENDED RELEASE ORAL
COMMUNITY
Start: 2017-10-30

## 2017-11-14 RX ORDER — DONEPEZIL HYDROCHLORIDE 5 MG/1
TABLET, FILM COATED ORAL
COMMUNITY
Start: 2017-10-25 | End: 2018-01-16 | Stop reason: CLARIF

## 2017-11-14 RX ORDER — RANOLAZINE 500 MG/1
TABLET, FILM COATED, EXTENDED RELEASE ORAL
COMMUNITY
Start: 2017-11-03

## 2017-11-14 RX ORDER — ATORVASTATIN CALCIUM 40 MG/1
TABLET, FILM COATED ORAL
COMMUNITY
Start: 2017-10-30

## 2017-11-14 NOTE — PROGRESS NOTES
"  Subjective:       Dilcia Orozco is a 72 y.o. female who is an established patient who was referred by Dr Kohler for evaluation of incontinence.      She reports issues with UI for about 4-6 months. She reports she "leaks all the time." She is unable to express what makes her leak - likely combination of VIRGEN and UUI. 3ppd. Nocturia x 2. Denies UTI. Constipation occasionally. She has Myrbetriq listed in MAR but is unsure if she is taking this.     A1c (6/12) - 12.6. She has known DM2 with neuropathy.  She again had issues with hypoglycemia today in clinic (she takes DM meds but does not eat breakfast).     PVR (bladder scan) last visit - 0cc, 15cc    She returns today reporting that symptoms are about the same. There is again some confusion if she is taking Ditropan as well as Myrbetriq. She seemed then to be only be taking Ditropan and not Myrbetriq (last visit was the opposite). Nocturia x 2. Continues to report UUI though difficult to explain UI episodes. She is taking both medications now without improvement.     I see her  as a patient as well (PCa treated by [facility]).     Cysto/UDS 3/21/17:  Findings of the Procedure: Urgency with UUI at low volumes - ++DO/IBC. Difficult to fill bladder so low capacity on UDS. 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 Recommend Botox initially for UUI. Sling would be technically challenging. Consider bulking agent though will like still have VIRGEN. Pessary unlikely to work due to severe vaginal sensitivity.    She is now s/p Btoox 100u on 4/28/17. She has much less leakage and frequency. She is vastly improved. Nocturia only x 1 (rather than 3-4).    PVR (bladder scan) last visit - 20cc    She saw Fabi in 10/17 with concern for UTI but was noted to have vaginitis and was treated for yeast infection. " "Glucosuria noted. UA was clear for UTI. She reports newly diagnosed onset of Alzheimer's. Botox seems to still be working well.       The following portions of the patient's history were reviewed and updated as appropriate: allergies, current medications, past family history, past medical history, past social history, past surgical history and problem list.    Review of Systems  Constitutional: no fever or chills  ENT: no nasal congestion or sore throat  Respiratory: no cough or shortness of breath  Cardiovascular: no chest pain or palpitations  Gastrointestinal: no nausea or vomiting, tolerating diet  Genitourinary: as per HPI  Hematologic/Lymphatic: no easy bruising or lymphadenopathy  Musculoskeletal: no arthralgias or myalgias  Skin: no rashes or lesions  Neurological: no seizures or tremors  Behavioral/Psych: no auditory or visual hallucinations       Objective:    Vitals:   BP (!) 112/56   Pulse 60   Resp 14   Ht 5' 2" (1.575 m)   Wt 94 kg (207 lb 3.7 oz)   BMI 37.90 kg/m²     Physical Exam   General: well developed, well nourished in no acute distress  Head: normocephalic, atraumatic  Neck: supple, trachea midline, no obvious enlargement of thyroid  HEENT: EOMI, mucus membranes moist, sclera anicteric, no hearing impairment  Lungs: symmetric expansion, non-labored breathing  Cardiovascular: regular rate and rhythm, normal pulses  Abdomen: soft, non tender, non distended, no palpable masses, no hepatosplenomegaly, no hernias, no CVA tenderness  Musculoskeletal: no peripheral edema, normal ROM in bilateral upper and lower extremities  Lymphatics: no cervical or inguinal lymphadenopathy  Skin: no rashes or lesions  Neuro: alert and oriented x 3, no gross deficits  Psych: normal judgment and insight, normal mood/affect and non-anxious  Genitourinary:   patient declined exam      Lab Review   Urine analysis today in clinic shows - no urine given    Lab Results   Component Value Date    WBC 5.08 04/24/2017    " HGB 13.7 04/24/2017    HCT 39.8 04/24/2017    MCV 92 04/24/2017    PLT 89 (L) 04/24/2017     Lab Results   Component Value Date    CREATININE 1.0 04/24/2017    BUN 7 (L) 04/24/2017       Imaging  NA       Assessment/Plan:      1. Mixed incontinence urge and stress    - Discussed difference of UUI and VIRGEN components. Reviewed etiology and workup of each.   - VIRGEN: Kegels, PFPT, bulking agent, MUS.   - UUI: Behavioral changes, PFPT, anticholinergics. Botox/InterStim for refractory UUI.   - Discussed importance of glucose control. Polyuria will make UI worse.   - Weight loss can also improve situation.   - Confusion as to what medication she is actually taking - seems to be taking both now (brought in all meds today)    - On Ditropan and Myrbetriq - still with sig UI   - Remains with DM issues   - Significant glucosuria that is likely contributing. No recent A1c in chart.    - s/p cysto/UDS 3/21/17   - s/p cysto/Botox 100u on 4/28/17. Doing great. PVR acceptable.    - Possible repeat Botox in 2-3 months           2. Glucosuria   - Contributing to yeast infections   - Needs much better glucose control - poorly controlled for years      Follow up in 2 months

## 2018-01-16 ENCOUNTER — OFFICE VISIT (OUTPATIENT)
Dept: UROLOGY | Facility: CLINIC | Age: 73
End: 2018-01-16
Payer: MEDICARE

## 2018-01-16 VITALS
RESPIRATION RATE: 14 BRPM | HEART RATE: 68 BPM | SYSTOLIC BLOOD PRESSURE: 126 MMHG | HEIGHT: 62 IN | WEIGHT: 206.56 LBS | DIASTOLIC BLOOD PRESSURE: 58 MMHG | BODY MASS INDEX: 38.01 KG/M2

## 2018-01-16 DIAGNOSIS — N39.46 MIXED INCONTINENCE URGE AND STRESS: Primary | ICD-10-CM

## 2018-01-16 DIAGNOSIS — R81 GLUCOSURIA: ICD-10-CM

## 2018-01-16 PROCEDURE — 99999 PR PBB SHADOW E&M-EST. PATIENT-LVL III: CPT | Mod: PBBFAC,,, | Performed by: UROLOGY

## 2018-01-16 PROCEDURE — 99213 OFFICE O/P EST LOW 20 MIN: CPT | Mod: S$GLB,,, | Performed by: UROLOGY

## 2018-01-16 RX ORDER — NEOMYCIN SULFATE, POLYMYXIN B SULFATE, AND DEXAMETHASONE 3.5; 10000; 1 MG/G; [USP'U]/G; MG/G
OINTMENT OPHTHALMIC
COMMUNITY
Start: 2017-12-14

## 2018-01-16 RX ORDER — DONEPEZIL HYDROCHLORIDE 10 MG/1
TABLET, FILM COATED ORAL
COMMUNITY
Start: 2017-12-24

## 2018-01-16 RX ORDER — INSULIN DEGLUDEC 200 U/ML
INJECTION, SOLUTION SUBCUTANEOUS
COMMUNITY
Start: 2017-12-26

## 2018-01-16 NOTE — PROGRESS NOTES
"  Subjective:       Dilcia Orozco is a 72 y.o. female who is an established patient who was referred by Dr Kohler for evaluation of incontinence.      She reports issues with UI for about 4-6 months. She reports she "leaks all the time." She is unable to express what makes her leak - likely combination of VIRGEN and UUI. 3ppd. Nocturia x 2. Denies UTI. Constipation occasionally. She has Myrbetriq listed in MAR but is unsure if she is taking this.     A1c (6/12) - 12.6. She has known DM2 with neuropathy.  She again had issues with hypoglycemia today in clinic (she takes DM meds but does not eat breakfast).     PVR (bladder scan) last visit - 0cc, 15cc    She returns today reporting that symptoms are about the same. There is again some confusion if she is taking Ditropan as well as Myrbetriq. She seemed then to be only be taking Ditropan and not Myrbetriq (last visit was the opposite). Nocturia x 2. Continues to report UUI though difficult to explain UI episodes. She is taking both medications now without improvement.     I see her  as a patient as well (PCa treated by [facility]).     Cysto/UDS 3/21/17:  Findings of the Procedure: Urgency with UUI at low volumes - ++DO/IBC. Difficult to fill bladder so low capacity on UDS. 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 Recommend Botox initially for UUI. Sling would be technically challenging. Consider bulking agent though will like still have VIRGEN. Pessary unlikely to work due to severe vaginal sensitivity.    She is now s/p Botox 100u on 4/28/17. She has much less leakage and frequency. She is vastly improved. Nocturia only x 1 (rather than 3-4).    PVR (bladder scan) last visit - 20cc    She saw Fabi in 10/17 with concern for UTI but was noted to have vaginitis and was treated for yeast infection. " "Glucosuria noted. UA was clear for UTI. She reports newly diagnosed onset of Alzheimer's.     UI has returned (as expected as Botox has worn off).       The following portions of the patient's history were reviewed and updated as appropriate: allergies, current medications, past family history, past medical history, past social history, past surgical history and problem list.    Review of Systems  Constitutional: no fever or chills  ENT: no nasal congestion or sore throat  Respiratory: no cough or shortness of breath  Cardiovascular: no chest pain or palpitations  Gastrointestinal: no nausea or vomiting, tolerating diet  Genitourinary: as per HPI  Hematologic/Lymphatic: no easy bruising or lymphadenopathy  Musculoskeletal: no arthralgias or myalgias  Skin: no rashes or lesions  Neurological: no seizures or tremors  Behavioral/Psych: no auditory or visual hallucinations       Objective:    Vitals:   Resp 14   Ht 5' 2" (1.575 m)   Wt 93.7 kg (206 lb 9.1 oz)   BMI 37.78 kg/m²     Physical Exam   General: well developed, well nourished in no acute distress  Head: normocephalic, atraumatic  Neck: supple, trachea midline, no obvious enlargement of thyroid  HEENT: EOMI, mucus membranes moist, sclera anicteric, no hearing impairment  Lungs: symmetric expansion, non-labored breathing  Cardiovascular: regular rate and rhythm, normal pulses  Abdomen: soft, non tender, non distended, no palpable masses, no hepatosplenomegaly, no hernias, no CVA tenderness  Musculoskeletal: no peripheral edema, normal ROM in bilateral upper and lower extremities  Lymphatics: no cervical or inguinal lymphadenopathy  Skin: no rashes or lesions  Neuro: alert and oriented x 3, no gross deficits  Psych: normal judgment and insight, normal mood/affect and non-anxious  Genitourinary:   patient declined exam      Lab Review   Urine analysis today in clinic shows - trace protein    Lab Results   Component Value Date    WBC 5.08 04/24/2017    HGB 13.7 " 04/24/2017    HCT 39.8 04/24/2017    MCV 92 04/24/2017    PLT 89 (L) 04/24/2017     Lab Results   Component Value Date    CREATININE 1.0 04/24/2017    BUN 7 (L) 04/24/2017       Imaging  NA       Assessment/Plan:      1. Mixed incontinence urge and stress    - Discussed difference of UUI and VIRGEN components. Reviewed etiology and workup of each.   - VIRGEN: Kegels, PFPT, bulking agent, MUS.   - UUI: Behavioral changes, PFPT, anticholinergics. Botox/InterStim for refractory UUI.   - Discussed importance of glucose control. Polyuria will make UI worse.   - Weight loss can also improve situation.   - Confusion as to what medication she is actually taking - seems to be taking both now (brought in all meds today)    - On Ditropan and Myrbetriq - still with sig UI   - Remains with DM issues   - Significant glucosuria that is likely contributing. No recent A1c in chart.    - s/p cysto/UDS 3/21/17   - s/p cysto/Botox 100u 4/28/17. Was doing well. PVR acceptable.    - Consider repeat Botox when she is ready.           2. Glucosuria   - Contributing to yeast infections   - Needs much better glucose control - poorly controlled for years      Follow up in 2-3 months with PVR

## (undated) DEVICE — SYR ONLY LUER LOCK 20CC

## (undated) DEVICE — Device

## (undated) DEVICE — COVER SNAP KAP 26IN

## (undated) DEVICE — NDL WILLIAMS CYSTOSCOPIC

## (undated) DEVICE — SUPPORT ULNA NERVE PROTECTOR

## (undated) DEVICE — TUBING FOR LABORIE PUMP

## (undated) DEVICE — SEE MEDLINE ITEM 157117

## (undated) DEVICE — GLOVE SURGICAL LATEX SZ 6.5

## (undated) DEVICE — GLOVE BIOGEL ECLIPSE SZ 7.5

## (undated) DEVICE — ELECTRODE NEOTRODE II

## (undated) DEVICE — MAT QUICK 40X30 FLOOR FLUID LF

## (undated) DEVICE — UNDERGLOVE BIOGEL PI SZ 6.5 LF

## (undated) DEVICE — SYR 10CC LUER LOCK

## (undated) DEVICE — CANISTER SUCTION 2 LTR

## (undated) DEVICE — CATH ABD 7FR

## (undated) DEVICE — NDL 18GA X1 1/2 REG BEVEL

## (undated) DEVICE — CATH BLADDER/URETERAL 7FR

## (undated) DEVICE — SOL NS 1000CC

## (undated) DEVICE — GLOVE, SURG,LATEX FREE SZ 7

## (undated) DEVICE — SEE MEDLINE ITEM 152564